# Patient Record
Sex: FEMALE | Race: BLACK OR AFRICAN AMERICAN | NOT HISPANIC OR LATINO | Employment: UNEMPLOYED | URBAN - METROPOLITAN AREA
[De-identification: names, ages, dates, MRNs, and addresses within clinical notes are randomized per-mention and may not be internally consistent; named-entity substitution may affect disease eponyms.]

---

## 2022-09-09 ENCOUNTER — HOSPITAL ENCOUNTER (EMERGENCY)
Facility: HOSPITAL | Age: 37
Discharge: HOME | End: 2022-09-09
Attending: STUDENT IN AN ORGANIZED HEALTH CARE EDUCATION/TRAINING PROGRAM
Payer: MEDICAID

## 2022-09-09 VITALS
HEART RATE: 73 BPM | BODY MASS INDEX: 34.83 KG/M2 | TEMPERATURE: 99 F | WEIGHT: 204 LBS | HEIGHT: 64 IN | OXYGEN SATURATION: 98 % | RESPIRATION RATE: 18 BRPM | SYSTOLIC BLOOD PRESSURE: 136 MMHG | DIASTOLIC BLOOD PRESSURE: 86 MMHG

## 2022-09-09 DIAGNOSIS — Z76.0 MEDICATION REFILL: ICD-10-CM

## 2022-09-09 DIAGNOSIS — R51.9 ACUTE NONINTRACTABLE HEADACHE, UNSPECIFIED HEADACHE TYPE: Primary | ICD-10-CM

## 2022-09-09 LAB
ALBUMIN SERPL-MCNC: 3.6 GM/DL (ref 3.5–5)
ALBUMIN/GLOB SERPL: 1.1 RATIO (ref 1.1–2)
ALP SERPL-CCNC: 76 UNIT/L (ref 40–150)
ALT SERPL-CCNC: 12 UNIT/L (ref 0–55)
AST SERPL-CCNC: 12 UNIT/L (ref 5–34)
B-HCG SERPL QL: NEGATIVE
BASOPHILS # BLD AUTO: 0.02 X10(3)/MCL (ref 0–0.2)
BASOPHILS NFR BLD AUTO: 0.3 %
BILIRUBIN DIRECT+TOT PNL SERPL-MCNC: 0.5 MG/DL
BUN SERPL-MCNC: 9.6 MG/DL (ref 7–18.7)
CALCIUM SERPL-MCNC: 8.8 MG/DL (ref 8.4–10.2)
CHLORIDE SERPL-SCNC: 106 MMOL/L (ref 98–107)
CO2 SERPL-SCNC: 25 MMOL/L (ref 22–29)
CREAT SERPL-MCNC: 0.75 MG/DL (ref 0.55–1.02)
EOSINOPHIL # BLD AUTO: 0.1 X10(3)/MCL (ref 0–0.9)
EOSINOPHIL NFR BLD AUTO: 1.3 %
ERYTHROCYTE [DISTWIDTH] IN BLOOD BY AUTOMATED COUNT: 13.5 % (ref 11.5–17)
FLUAV AG UPPER RESP QL IA.RAPID: NOT DETECTED
FLUBV AG UPPER RESP QL IA.RAPID: NOT DETECTED
GFR SERPLBLD CREATININE-BSD FMLA CKD-EPI: >60 MLS/MIN/1.73/M2
GLOBULIN SER-MCNC: 3.2 GM/DL (ref 2.4–3.5)
GLUCOSE SERPL-MCNC: 85 MG/DL (ref 74–100)
HCT VFR BLD AUTO: 37.7 % (ref 37–47)
HGB BLD-MCNC: 12.6 GM/DL (ref 12–16)
IMM GRANULOCYTES # BLD AUTO: 0.03 X10(3)/MCL (ref 0–0.04)
IMM GRANULOCYTES NFR BLD AUTO: 0.4 %
LYMPHOCYTES # BLD AUTO: 1.75 X10(3)/MCL (ref 0.6–4.6)
LYMPHOCYTES NFR BLD AUTO: 23.1 %
MCH RBC QN AUTO: 32.4 PG (ref 27–31)
MCHC RBC AUTO-ENTMCNC: 33.4 MG/DL (ref 33–36)
MCV RBC AUTO: 96.9 FL (ref 80–94)
MONOCYTES # BLD AUTO: 0.55 X10(3)/MCL (ref 0.1–1.3)
MONOCYTES NFR BLD AUTO: 7.3 %
NEUTROPHILS # BLD AUTO: 5.1 X10(3)/MCL (ref 2.1–9.2)
NEUTROPHILS NFR BLD AUTO: 67.6 %
NRBC BLD AUTO-RTO: 0 %
PLATELET # BLD AUTO: 267 X10(3)/MCL (ref 130–400)
PMV BLD AUTO: 9.9 FL (ref 7.4–10.4)
POTASSIUM SERPL-SCNC: 4 MMOL/L (ref 3.5–5.1)
PROT SERPL-MCNC: 6.8 GM/DL (ref 6.4–8.3)
RBC # BLD AUTO: 3.89 X10(6)/MCL (ref 4.2–5.4)
SARS-COV-2 RNA RESP QL NAA+PROBE: NOT DETECTED
SODIUM SERPL-SCNC: 137 MMOL/L (ref 136–145)
WBC # SPEC AUTO: 7.6 X10(3)/MCL (ref 4.5–11.5)

## 2022-09-09 PROCEDURE — 36415 COLL VENOUS BLD VENIPUNCTURE: CPT | Performed by: NURSE PRACTITIONER

## 2022-09-09 PROCEDURE — 99284 EMERGENCY DEPT VISIT MOD MDM: CPT | Mod: 25

## 2022-09-09 PROCEDURE — 25000003 PHARM REV CODE 250: Performed by: PHYSICIAN ASSISTANT

## 2022-09-09 PROCEDURE — 80053 COMPREHEN METABOLIC PANEL: CPT | Performed by: NURSE PRACTITIONER

## 2022-09-09 PROCEDURE — 87636 SARSCOV2 & INF A&B AMP PRB: CPT | Performed by: NURSE PRACTITIONER

## 2022-09-09 PROCEDURE — 81025 URINE PREGNANCY TEST: CPT | Performed by: NURSE PRACTITIONER

## 2022-09-09 PROCEDURE — 85025 COMPLETE CBC W/AUTO DIFF WBC: CPT | Performed by: NURSE PRACTITIONER

## 2022-09-09 RX ORDER — ACETAMINOPHEN 500 MG
1000 TABLET ORAL
Status: COMPLETED | OUTPATIENT
Start: 2022-09-09 | End: 2022-09-09

## 2022-09-09 RX ORDER — AMLODIPINE BESYLATE 10 MG/1
10 TABLET ORAL DAILY
Qty: 30 TABLET | Refills: 11 | Status: SHIPPED | OUTPATIENT
Start: 2022-09-09 | End: 2023-01-05 | Stop reason: SDUPTHER

## 2022-09-09 RX ORDER — AMLODIPINE BESYLATE 5 MG/1
10 TABLET ORAL
Status: COMPLETED | OUTPATIENT
Start: 2022-09-09 | End: 2022-09-09

## 2022-09-09 RX ADMIN — AMLODIPINE BESYLATE 10 MG: 5 TABLET ORAL at 02:09

## 2022-09-09 RX ADMIN — ACETAMINOPHEN 1000 MG: 500 TABLET, FILM COATED ORAL at 02:09

## 2022-09-09 NOTE — ED PROVIDER NOTES
"Encounter Date: 9/9/2022       History     Chief Complaint   Patient presents with    Headache     Pt complains of headache. Onset through the night. Denies weakness. Hx of HTN. States out of meds for "a while." Denies n/v     37 y.o. female presents to the ED with headache onset this morning. States she has been out of her HTN medications "for awhile" due to moving and being unable to find a new physician. Denies blurred vision, chest pain, SOB, n/v, fever, chills. Did not try any medications at home.     The history is provided by the patient. No  was used.   Headache   This is a new problem. The current episode started today. The problem occurs constantly. The problem has been waxing and waning. The pain is located in the Bilateral region. The pain does not radiate. The quality of the pain is described as aching. Pertinent negatives include no abdominal pain, blurred vision, coughing, dizziness, fever, nausea, neck pain, phonophobia, photophobia or vomiting. Nothing aggravates the symptoms. She has tried nothing for the symptoms.     Review of patient's allergies indicates:  No Known Allergies  No past medical history on file.  No past surgical history on file.  No family history on file.  Social History     Tobacco Use    Smoking status: Every Day     Types: Cigarettes    Smokeless tobacco: Never     Review of Systems   Constitutional:  Negative for chills and fever.   Eyes:  Negative for blurred vision, photophobia and visual disturbance.   Respiratory:  Negative for cough and shortness of breath.    Cardiovascular:  Negative for chest pain.   Gastrointestinal:  Negative for abdominal pain, nausea and vomiting.   Genitourinary:  Negative for dysuria.   Musculoskeletal:  Negative for arthralgias and neck pain.   Skin:  Negative for color change and rash.   Neurological:  Positive for headaches. Negative for dizziness.   Psychiatric/Behavioral:  Negative for behavioral problems.    All other " systems reviewed and are negative.    Physical Exam     Initial Vitals [09/09/22 0909]   BP Pulse Resp Temp SpO2   (!) 142/93 64 18 99 °F (37.2 °C) 99 %      MAP       --         Physical Exam    Nursing note and vitals reviewed.  Constitutional: She appears well-developed and well-nourished.   HENT:   Head: Normocephalic and atraumatic.   Eyes: EOM are normal. Pupils are equal, round, and reactive to light.   Neck: Neck supple.   Cardiovascular:  Normal rate, regular rhythm and normal heart sounds.           Pulmonary/Chest: Breath sounds normal.   Abdominal: Abdomen is soft. Bowel sounds are normal.   Musculoskeletal:         General: Normal range of motion.      Cervical back: Neck supple.     Neurological: She is alert and oriented to person, place, and time. She has normal strength. GCS score is 15. GCS eye subscore is 4. GCS verbal subscore is 5. GCS motor subscore is 6.   Skin: Skin is warm and dry.   Psychiatric: She has a normal mood and affect.       ED Course   Procedures  Labs Reviewed   CBC WITH DIFFERENTIAL - Abnormal; Notable for the following components:       Result Value    RBC 3.89 (*)     MCV 96.9 (*)     MCH 32.4 (*)     All other components within normal limits   COVID/FLU A&B PCR - Normal   HCG QUALITATIVE URINE - Normal   CBC W/ AUTO DIFFERENTIAL    Narrative:     The following orders were created for panel order CBC Auto Differential.  Procedure                               Abnormality         Status                     ---------                               -----------         ------                     CBC with Differential[[id number]]        Abnormal            Final result                 Please view results for these tests on the individual orders.   COMPREHENSIVE METABOLIC PANEL          Imaging Results              CT Head Without Contrast (Final result)  Result time 09/09/22 13:49:58      Final result by Mateus Carroll MD (09/09/22 13:49:58)                   Impression:      No  "acute intracranial abnormality identified.      Electronically signed by: Mateus Carroll  Date:    09/09/2022  Time:    13:49               Narrative:    EXAMINATION:  CT HEAD WITHOUT CONTRAST    CLINICAL HISTORY:  Headache, new or worsening, neuro deficit (Age 19-49y);    TECHNIQUE:  Low dose axial images were obtained through the head.  Coronal and sagittal reformations were also performed. Contrast was not administered.    Automatic exposure control was utilized to reduce the patient's radiation dose.    DLP= 1016    COMPARISON:  None.    FINDINGS:  No acute intracranial hemorrhage, edema or mass. No acute parenchymal abnormality.    There is no hydrocephalus, evidence of herniation or midline shift. The ventricles and sulci are normal.    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    There is normal gray white differentiation.    The osseous structures are normal.    The mastoid air cells are clear.    The auditory canals are patent bilaterally.    The globes and orbital contents are normal bilaterally.    The visualized maxillary, ethmoid and sphenoid sinuses are clear.                                       Medications   acetaminophen tablet 1,000 mg (1,000 mg Oral Given 9/9/22 1412)   amLODIPine tablet 10 mg (10 mg Oral Given 9/9/22 1413)     REPEAT VS: improved BP after amlodipine   /86 (BP Location: Left arm, Patient Position: Sitting)   Pulse 73   Temp 99 °F (37.2 °C) (Oral)   Resp 18   Ht 5' 4" (1.626 m)   Wt 92.5 kg (204 lb)   SpO2 98%   BMI 35.02 kg/m²     Medical Decision Making:   Differential Diagnosis:   ICH, tension headache, migraine headache  Clinical Tests:   Lab Tests: Reviewed  Radiological Study: Reviewed                    Clinical Impression:   Final diagnoses:  [R51.9] Acute nonintractable headache, unspecified headache type (Primary)  [Z76.0] Medication refill      ED Disposition Condition    Discharge Stable          ED Prescriptions       Medication Sig Dispense Start " Date End Date Auth. Provider    amLODIPine (NORVASC) 10 MG tablet Take 1 tablet (10 mg total) by mouth once daily. 30 tablet 9/9/2022 9/9/2023 Ant Mcfarland PA-C          Follow-up Information       Follow up With Specialties Details Why Contact Info    [facility] Emergency Medicine In 1 week If symptoms worsen [address] [facility] [id number] [phone]    Primary care provider  In 2 days As needed     [facility]   Referral has been sent on your behalf; they will call to schedule follow-up appointment              Ant Mcfarland PA-C  09/09/22 2037

## 2022-09-09 NOTE — FIRST PROVIDER EVALUATION
"Medical screening examination initiated.  I have conducted a focused provider triage encounter, findings are as follows:    Brief history of present illness:  Patient states headache, weakness, and runny nose    Vitals:    09/09/22 0909   BP: (!) 142/93   BP Location: Left arm   Patient Position: Sitting   Pulse: 64   Resp: 18   Temp: 99 °F (37.2 °C)   TempSrc: Oral   SpO2: 99%   Weight: 92.5 kg (204 lb)   Height: 5' 4" (1.626 m)       Pertinent physical exam:  Awake, alert, ambulatory    Brief workup plan:  labs    Preliminary workup initiated; this workup will be continued and followed by the physician or advanced practice provider that is assigned to the patient when roomed.  "

## 2022-09-09 NOTE — ED TRIAGE NOTES
"Pt complains of headache. Onset through the night. Denies weakness. Hx of HTN. States out of meds for "a while." Denies n/v  "

## 2023-01-05 ENCOUNTER — OFFICE VISIT (OUTPATIENT)
Dept: FAMILY MEDICINE | Facility: CLINIC | Age: 38
End: 2023-01-05
Payer: MEDICAID

## 2023-01-05 VITALS
TEMPERATURE: 99 F | DIASTOLIC BLOOD PRESSURE: 87 MMHG | BODY MASS INDEX: 34.37 KG/M2 | HEIGHT: 64 IN | OXYGEN SATURATION: 100 % | SYSTOLIC BLOOD PRESSURE: 138 MMHG | WEIGHT: 201.31 LBS | RESPIRATION RATE: 18 BRPM | HEART RATE: 70 BPM

## 2023-01-05 DIAGNOSIS — I10 HYPERTENSION, UNSPECIFIED TYPE: ICD-10-CM

## 2023-01-05 DIAGNOSIS — Z00.00 ENCOUNTER FOR WELLNESS EXAMINATION: Primary | ICD-10-CM

## 2023-01-05 DIAGNOSIS — R10.2 PELVIC PAIN: ICD-10-CM

## 2023-01-05 DIAGNOSIS — F51.01 PRIMARY INSOMNIA: ICD-10-CM

## 2023-01-05 DIAGNOSIS — Z72.0 TOBACCO ABUSE: ICD-10-CM

## 2023-01-05 DIAGNOSIS — F41.9 ANXIETY: ICD-10-CM

## 2023-01-05 LAB
ALBUMIN SERPL-MCNC: 3.8 G/DL (ref 3.5–5)
ALBUMIN/GLOB SERPL: 1.1 RATIO (ref 1.1–2)
ALP SERPL-CCNC: 86 UNIT/L (ref 40–150)
ALT SERPL-CCNC: 10 UNIT/L (ref 0–55)
AMORPH URATE CRY URNS QL MICRO: ABNORMAL /UL
APPEARANCE UR: ABNORMAL
AST SERPL-CCNC: 12 UNIT/L (ref 5–34)
BACTERIA #/AREA URNS AUTO: ABNORMAL /HPF
BASOPHILS # BLD AUTO: 0.03 X10(3)/MCL (ref 0–0.2)
BASOPHILS NFR BLD AUTO: 0.4 %
BILIRUB UR QL STRIP.AUTO: NEGATIVE MG/DL
BILIRUBIN DIRECT+TOT PNL SERPL-MCNC: 0.7 MG/DL
BUN SERPL-MCNC: 10.5 MG/DL (ref 7–18.7)
CALCIUM SERPL-MCNC: 8.8 MG/DL (ref 8.4–10.2)
CHLORIDE SERPL-SCNC: 106 MMOL/L (ref 98–107)
CHOLEST SERPL-MCNC: 113 MG/DL
CHOLEST/HDLC SERPL: 2 {RATIO} (ref 0–5)
CO2 SERPL-SCNC: 26 MMOL/L (ref 22–29)
COLOR UR AUTO: ABNORMAL
CREAT SERPL-MCNC: 0.75 MG/DL (ref 0.55–1.02)
EOSINOPHIL # BLD AUTO: 0.08 X10(3)/MCL (ref 0–0.9)
EOSINOPHIL NFR BLD AUTO: 1.1 %
ERYTHROCYTE [DISTWIDTH] IN BLOOD BY AUTOMATED COUNT: 13.2 % (ref 11–14.5)
EST. AVERAGE GLUCOSE BLD GHB EST-MCNC: 91.1 MG/DL
GFR SERPLBLD CREATININE-BSD FMLA CKD-EPI: >90 MLS/MIN/1.73/M2
GLOBULIN SER-MCNC: 3.4 GM/DL (ref 2.4–3.5)
GLUCOSE SERPL-MCNC: 80 MG/DL (ref 74–100)
GLUCOSE UR QL STRIP.AUTO: NORMAL MG/DL
HAV IGM SERPL QL IA: NONREACTIVE
HBA1C MFR BLD: 4.8 %
HBV CORE IGM SERPL QL IA: NONREACTIVE
HBV SURFACE AG SERPL QL IA: NONREACTIVE
HCT VFR BLD AUTO: 37.9 % (ref 37–47)
HCV AB SERPL QL IA: NONREACTIVE
HDLC SERPL-MCNC: 51 MG/DL (ref 35–60)
HGB BLD-MCNC: 12.8 GM/DL (ref 12–16)
HIV 1+2 AB+HIV1 P24 AG SERPL QL IA: NONREACTIVE
IMM GRANULOCYTES # BLD AUTO: 0.01 X10(3)/MCL (ref 0–0.04)
IMM GRANULOCYTES NFR BLD AUTO: 0.1 %
KETONES UR QL STRIP.AUTO: NEGATIVE MG/DL
LDLC SERPL CALC-MCNC: 54 MG/DL (ref 50–140)
LEUKOCYTE ESTERASE UR QL STRIP.AUTO: NEGATIVE UNIT/L
LYMPHOCYTES # BLD AUTO: 1.77 X10(3)/MCL (ref 0.6–4.6)
LYMPHOCYTES NFR BLD AUTO: 24.5 %
MCH RBC QN AUTO: 32.7 PG
MCHC RBC AUTO-ENTMCNC: 33.8 MG/DL (ref 33–36)
MCV RBC AUTO: 96.7 FL (ref 80–94)
MONOCYTES # BLD AUTO: 0.46 X10(3)/MCL (ref 0.1–1.3)
MONOCYTES NFR BLD AUTO: 6.4 %
NEUTROPHILS # BLD AUTO: 4.86 X10(3)/MCL (ref 2.1–9.2)
NEUTROPHILS NFR BLD AUTO: 67.5 %
NITRITE UR QL STRIP.AUTO: NEGATIVE
NRBC BLD AUTO-RTO: 0 % (ref 0–1)
PH UR STRIP.AUTO: 5.5 [PH]
PLATELET # BLD AUTO: 249 X10(3)/MCL (ref 140–371)
PMV BLD AUTO: 10.5 FL (ref 9.4–12.4)
POTASSIUM SERPL-SCNC: 4.2 MMOL/L (ref 3.5–5.1)
PROT SERPL-MCNC: 7.2 GM/DL (ref 6.4–8.3)
PROT UR QL STRIP.AUTO: ABNORMAL MG/DL
RBC # BLD AUTO: 3.92 X10(6)/MCL (ref 4.2–5.4)
RBC #/AREA URNS AUTO: ABNORMAL /HPF
RBC UR QL AUTO: ABNORMAL UNIT/L
SODIUM SERPL-SCNC: 136 MMOL/L (ref 136–145)
SP GR UR STRIP.AUTO: 1.02
SQUAMOUS #/AREA URNS LPF: ABNORMAL /HPF
T PALLIDUM AB SER QL: NONREACTIVE
T4 FREE SERPL-MCNC: 0.87 NG/DL (ref 0.7–1.48)
TRIGL SERPL-MCNC: 42 MG/DL (ref 37–140)
TSH SERPL-ACNC: 0.66 UIU/ML (ref 0.35–4.94)
UROBILINOGEN UR STRIP-ACNC: NORMAL MG/DL
VLDLC SERPL CALC-MCNC: 8 MG/DL
WBC # SPEC AUTO: 7.2 X10(3)/MCL (ref 4.5–11.5)
WBC #/AREA URNS AUTO: ABNORMAL /HPF

## 2023-01-05 PROCEDURE — 99385 PR PREVENTIVE VISIT,NEW,18-39: ICD-10-PCS | Mod: S$PBB,25,,

## 2023-01-05 PROCEDURE — 80061 LIPID PANEL: CPT

## 2023-01-05 PROCEDURE — 81001 URINALYSIS AUTO W/SCOPE: CPT

## 2023-01-05 PROCEDURE — 80053 COMPREHEN METABOLIC PANEL: CPT

## 2023-01-05 PROCEDURE — 86780 TREPONEMA PALLIDUM: CPT

## 2023-01-05 PROCEDURE — 99406 PR TOBACCO USE CESSATION INTERMEDIATE 3-10 MINUTES: ICD-10-PCS | Mod: S$PBB,,,

## 2023-01-05 PROCEDURE — 80074 ACUTE HEPATITIS PANEL: CPT

## 2023-01-05 PROCEDURE — 3008F PR BODY MASS INDEX (BMI) DOCUMENTED: ICD-10-PCS | Mod: CPTII,,,

## 2023-01-05 PROCEDURE — 83036 HEMOGLOBIN GLYCOSYLATED A1C: CPT

## 2023-01-05 PROCEDURE — 3079F PR MOST RECENT DIASTOLIC BLOOD PRESSURE 80-89 MM HG: ICD-10-PCS | Mod: CPTII,,,

## 2023-01-05 PROCEDURE — 99215 OFFICE O/P EST HI 40 MIN: CPT | Mod: PBBFAC,PN

## 2023-01-05 PROCEDURE — 3079F DIAST BP 80-89 MM HG: CPT | Mod: CPTII,,,

## 2023-01-05 PROCEDURE — 99214 OFFICE O/P EST MOD 30 MIN: CPT | Mod: 25,S$PBB,,

## 2023-01-05 PROCEDURE — 87389 HIV-1 AG W/HIV-1&-2 AB AG IA: CPT

## 2023-01-05 PROCEDURE — 99406 BEHAV CHNG SMOKING 3-10 MIN: CPT | Mod: S$PBB,,,

## 2023-01-05 PROCEDURE — 99214 PR OFFICE/OUTPT VISIT, EST, LEVL IV, 30-39 MIN: ICD-10-PCS | Mod: 25,S$PBB,,

## 2023-01-05 PROCEDURE — 3008F BODY MASS INDEX DOCD: CPT | Mod: CPTII,,,

## 2023-01-05 PROCEDURE — 1159F MED LIST DOCD IN RCRD: CPT | Mod: CPTII,,,

## 2023-01-05 PROCEDURE — 84443 ASSAY THYROID STIM HORMONE: CPT

## 2023-01-05 PROCEDURE — 1160F RVW MEDS BY RX/DR IN RCRD: CPT | Mod: CPTII,,,

## 2023-01-05 PROCEDURE — 36415 COLL VENOUS BLD VENIPUNCTURE: CPT

## 2023-01-05 PROCEDURE — 85025 COMPLETE CBC W/AUTO DIFF WBC: CPT

## 2023-01-05 PROCEDURE — 99385 PREV VISIT NEW AGE 18-39: CPT | Mod: S$PBB,25,,

## 2023-01-05 PROCEDURE — 3075F SYST BP GE 130 - 139MM HG: CPT | Mod: CPTII,,,

## 2023-01-05 PROCEDURE — 3075F PR MOST RECENT SYSTOLIC BLOOD PRESS GE 130-139MM HG: ICD-10-PCS | Mod: CPTII,,,

## 2023-01-05 PROCEDURE — 1159F PR MEDICATION LIST DOCUMENTED IN MEDICAL RECORD: ICD-10-PCS | Mod: CPTII,,,

## 2023-01-05 PROCEDURE — 84439 ASSAY OF FREE THYROXINE: CPT

## 2023-01-05 PROCEDURE — 1160F PR REVIEW ALL MEDS BY PRESCRIBER/CLIN PHARMACIST DOCUMENTED: ICD-10-PCS | Mod: CPTII,,,

## 2023-01-05 RX ORDER — AMLODIPINE BESYLATE 10 MG/1
10 TABLET ORAL DAILY
Qty: 90 TABLET | Refills: 0 | Status: SHIPPED | OUTPATIENT
Start: 2023-01-05 | End: 2023-08-22

## 2023-01-05 RX ORDER — ATOMOXETINE 80 MG/1
80 CAPSULE ORAL EVERY MORNING
COMMUNITY
Start: 2022-12-28

## 2023-01-05 RX ORDER — BUSPIRONE HYDROCHLORIDE 15 MG/1
7.5-15 TABLET ORAL 2 TIMES DAILY
Qty: 60 TABLET | Refills: 2 | Status: SHIPPED | OUTPATIENT
Start: 2023-01-05 | End: 2023-11-01

## 2023-01-05 RX ORDER — CETIRIZINE HYDROCHLORIDE 10 MG/1
10 TABLET ORAL
COMMUNITY
Start: 2022-11-19 | End: 2023-11-01 | Stop reason: SDUPTHER

## 2023-01-05 RX ORDER — QUETIAPINE FUMARATE 100 MG/1
100 TABLET, FILM COATED ORAL NIGHTLY
Qty: 90 TABLET | Refills: 0 | Status: SHIPPED | OUTPATIENT
Start: 2023-01-05 | End: 2023-03-27 | Stop reason: SDUPTHER

## 2023-01-05 RX ORDER — BUSPIRONE HYDROCHLORIDE 15 MG/1
7.5-15 TABLET ORAL 2 TIMES DAILY PRN
COMMUNITY
Start: 2022-12-28 | End: 2023-01-05 | Stop reason: SDUPTHER

## 2023-01-05 RX ORDER — QUETIAPINE FUMARATE 100 MG/1
TABLET, FILM COATED ORAL
COMMUNITY
End: 2023-01-05 | Stop reason: SDUPTHER

## 2023-01-05 NOTE — ASSESSMENT & PLAN NOTE
Encouraged and congratulated on smoking cessation.  Smoking cessation discussed for 5 minutes.  Discussed benefits of quitting including improved health, decreased cardiac/vascular/pulmonary/stroke risks as well as saving money

## 2023-01-05 NOTE — PROGRESS NOTES
Patient Name: Renee Anaya   : 1985  MRN: [id number]     Subjective:   Patient ID: Renee Anaya is a 37 y.o. female.    Chief Complaint:   Chief Complaint   Patient presents with    Establish Care     Gyn referral         HPI: 2023:  Patient presents to clinic today to establish care, she was previously in [place] [facility].  Patient being managed for hypertension, insomnia, ADHD.  Patient states that her medications are effective dose currently.  Patient does not know when her last Pap smear was she does have a son who is 5 years old.  Patient is unsure of when her last wellness labs were drawn but states that she does not think she is ever had any thing wrong with her blood work.    Additionally today's biggest complaint is that she has extremely painful menstrual cycles and will have residual pelvic pain after bowel movements.  Patient states that there is no blood in her stool or urine.  Patient states that it has been this way for about 3 months consistently, previously it was off and on for about 4 years.  Patient further states that she had ovarian cyst between her 1st and 2nd child but states on a recent ultrasound they were unable to see cyst.  Patient states that intercourse is painful as well.  Patient does endorse malodorous urine, states it has been this way for about 2 weeks.  Endorses previous Trichomonas infections.      ROS:  Review of Systems   Constitutional:  Negative for chills, fever and weight loss.   HENT:  Negative for ear discharge, nosebleeds and tinnitus.    Eyes:  Negative for blurred vision, photophobia and pain.   Respiratory:  Negative for cough, shortness of breath, wheezing and stridor.    Cardiovascular:  Negative for chest pain, palpitations and orthopnea.   Gastrointestinal:  Positive for abdominal pain. Negative for heartburn and nausea.   Genitourinary:  Negative for dysuria, frequency, hematuria and urgency.   Musculoskeletal:  Negative for  "falls and myalgias.   Skin:  Negative for itching and rash.   Neurological:  Negative for dizziness, sensory change, speech change, focal weakness, seizures, weakness and headaches.   Endo/Heme/Allergies:  Negative for environmental allergies. Does not bruise/bleed easily.   Psychiatric/Behavioral:  Negative for hallucinations and suicidal ideas.     History:     Past Medical History:   Diagnosis Date    Hypertension       Past Surgical History:   Procedure Laterality Date     SECTION      TONSILLECTOMY       History reviewed. No pertinent family history.   Social History     Tobacco Use    Smoking status: Every Day     Types: Cigarettes    Smokeless tobacco: Never   Substance and Sexual Activity    Alcohol use: Not Currently    Drug use: Yes     Types: Marijuana    Sexual activity: Yes     Partners: Male        Allergies:   Review of patient's allergies indicates:   Allergen Reactions    Penicillins Hives, Itching and Shortness Of Breath    Tramadol Itching    Ibuprofen Nausea Only    Milk containing products Nausea Only     Objective:     Vitals:    23 1225   BP: 138/87   Pulse: 70   Resp: 18   Temp: 98.6 °F (37 °C)   SpO2: 100%   Weight: 91.3 kg (201 lb 4.8 oz)   Height: 5' 4" (1.626 m)     Body mass index is 34.55 kg/m².     Physical Examination:   Physical Exam  Vitals reviewed.   Constitutional:       Appearance: Normal appearance. She is normal weight.   HENT:      Head: Normocephalic.      Right Ear: Tympanic membrane, ear canal and external ear normal.      Left Ear: Tympanic membrane, ear canal and external ear normal.      Nose: Nose normal.      Mouth/Throat:      Mouth: Mucous membranes are moist.      Pharynx: Oropharynx is clear.   Eyes:      Extraocular Movements: Extraocular movements intact.      Conjunctiva/sclera: Conjunctivae normal.      Pupils: Pupils are equal, round, and reactive to light.   Cardiovascular:      Rate and Rhythm: Normal rate and regular rhythm.      Pulses: " Normal pulses.      Heart sounds: Normal heart sounds.   Pulmonary:      Effort: Pulmonary effort is normal.      Breath sounds: Normal breath sounds.   Abdominal:      General: Abdomen is flat. Bowel sounds are normal.      Palpations: Abdomen is soft.   Musculoskeletal:         General: Normal range of motion.      Cervical back: Normal range of motion and neck supple.   Skin:     General: Skin is warm and dry.   Neurological:      General: No focal deficit present.      Mental Status: She is alert and oriented to person, place, and time.   Psychiatric:         Mood and Affect: Mood normal.         Behavior: Behavior normal.       Assessment:     Problem List Items Addressed This Visit          Psychiatric    Anxiety (Chronic)    Overview       Practice deep breathing or abdominal breathing exercises when anxiety occurs.  Exercise daily. Get sunlight daily.  Avoid caffeine, alcohol and stimulants.  Practice positive phrases and repeat throughout the day, yoga, lavender scents or Chamomile tea will help anxiety.  Set healthy boundaries, avoid people and conversations that increase stress.  Reports any symptoms of suicidal or homicidal ideations immediately, if clinic is closed go to nearest emergency room.             Current Assessment & Plan     Chronic issue, controlled with BuSpar 15 mg b.i.d..  Denies SI/HI or episodes of ilir.         Relevant Medications    busPIRone (BUSPAR) 15 MG tablet       Cardiac/Vascular    Hypertension (Chronic)    Overview     Low Sodium Diet (Dash Diet - less than 2 grams of sodium per day).  Monitor Blood Pressure daily and log. Report any consistent numbers greater than 140/90.  Smoking Cessation encouraged to aid in BP reduction.  Maintain healthy weight with goal BMI <30. Exercise 30 minutes per day 5 days per week           Current Assessment & Plan     Chronic issue, stable on Norvasc 10 mg daily. Denies HA, blurred vision, chest pain, SOB, palpitations or edema.             Relevant Medications    amLODIPine (NORVASC) 10 MG tablet       GI    Pelvic pain    Current Assessment & Plan     UA today, ultrasound ordered.         Relevant Orders    US Abdomen Pelvis Doppler Study Limited    US Pelvis Limited Non OB       Other    Primary insomnia (Chronic)    Overview     Avoid caffeine, alcohol and stimulants. Do not use illicit drugs.  Practice positive phrases and repeat throughout the day.  Try yoga, lavender scents or Chamomile tea to promote relaxation.  Set healthy boundaries, avoid people and conversations that increase stress.  Avoid caffeinated beverages after lunch  Avoid alcohol near bedtime (eg, late afternoon and evening)  Avoid smoking or other nicotine intake, particularly during the evening  Exercise regularly for at least 20 minutes, preferably more than four to five hours prior to bedtime  Avoid daytime naps, especially if they are longer than 20 to 30 minutes or occur late in the day  Resolve concerns or worries before bedtime  Try not to force sleep    Sleep Hygiene Techniques: Sleep hygiene refers to actions that tend to improve and maintain good sleep  Power down electronic devices at least one hour prior to bedtime.  Keep room dark; use eye mask or relaxation sound machine to promote rest.  Sleep as long as necessary to feel rested (usually seven to eight hours for adults) and then get out of bed  Maintain a regular sleep schedule, particularly a regular wake-up time in the morning           Current Assessment & Plan     Chronic issue, stable with Seroquel 100 mg nightly.         Relevant Medications    QUEtiapine (SEROQUEL) 100 MG Tab    Tobacco abuse    Current Assessment & Plan     Encouraged and congratulated on smoking cessation.  Smoking cessation discussed for 5 minutes.  Discussed benefits of quitting including improved health, decreased cardiac/vascular/pulmonary/stroke risks as well as saving money            Other Visit Diagnoses       Encounter for  wellness examination    -  Primary    Relevant Orders    TSH    T4, Free    Hemoglobin A1C    SYPHILIS ANTIBODY (WITH REFLEX RPR)    Hepatitis Panel, Acute    Lipid Panel    CBC Auto Differential    Comprehensive Metabolic Panel    HIV 1/2 Ag/Ab (4th Gen)    Urinalysis, Reflex to Urine Culture Urine, Clean Catch    Chlamydia/GC, PCR    Trichomonas Vaginalis, CARINE    Ambulatory referral/consult to Gynecology            Plan:   Renee was seen today for establish care.    Diagnoses and all orders for this visit:    Encounter for wellness examination  -     TSH  -     T4, Free  -     Hemoglobin A1C  -     SYPHILIS ANTIBODY (WITH REFLEX RPR)  -     Hepatitis Panel, Acute  -     Lipid Panel  -     CBC Auto Differential  -     Comprehensive Metabolic Panel  -     HIV 1/2 Ag/Ab (4th Gen)  -     Urinalysis, Reflex to Urine Culture Urine, Clean Catch  -     Chlamydia/GC, PCR  -     Trichomonas Vaginalis, CARINE  -     Ambulatory referral/consult to Gynecology; Future    Hypertension, unspecified type  -     Ambulatory referral/consult to Internal Medicine  -     amLODIPine (NORVASC) 10 MG tablet; Take 1 tablet (10 mg total) by mouth once daily.    Primary insomnia  -     QUEtiapine (SEROQUEL) 100 MG Tab; Take 1 tablet (100 mg total) by mouth nightly.    Anxiety  -     busPIRone (BUSPAR) 15 MG tablet; Take 0.5-1 tablets (7.5-15 mg total) by mouth 2 (two) times daily.    Pelvic pain  -     US Abdomen Pelvis Doppler Study Limited; Future  -     US Pelvis Limited Non OB; Future    Tobacco abuse       Follow up in about 2 weeks (around 1/19/2023) for Virtual Visit, review labs.     This note was created with the assistance of Dragon voice recognition software or phone dictation. There may be transcription errors as a result of using this technology however minimal. Effort has been made to assure accuracy of transcription but any obvious errors or omissions should be clarified with the author of the document

## 2023-01-05 NOTE — ASSESSMENT & PLAN NOTE
Chronic issue, stable on Norvasc 10 mg daily. Denies HA, blurred vision, chest pain, SOB, palpitations or edema.

## 2023-01-08 LAB — PATH REV: NORMAL

## 2023-01-23 ENCOUNTER — HOSPITAL ENCOUNTER (OUTPATIENT)
Dept: RADIOLOGY | Facility: HOSPITAL | Age: 38
Discharge: HOME | End: 2023-01-23
Payer: MEDICAID

## 2023-01-23 DIAGNOSIS — R10.2 PELVIC PAIN: ICD-10-CM

## 2023-01-23 PROCEDURE — 76856 US EXAM PELVIC COMPLETE: CPT | Mod: TC

## 2023-01-24 ENCOUNTER — OFFICE VISIT (OUTPATIENT)
Dept: FAMILY MEDICINE | Facility: CLINIC | Age: 38
End: 2023-01-24
Payer: MEDICAID

## 2023-01-24 DIAGNOSIS — M54.40 BILATERAL LOW BACK PAIN WITH SCIATICA, SCIATICA LATERALITY UNSPECIFIED, UNSPECIFIED CHRONICITY: Primary | ICD-10-CM

## 2023-01-24 DIAGNOSIS — Z72.0 TOBACCO ABUSE: ICD-10-CM

## 2023-01-24 DIAGNOSIS — R93.41 ABNORMAL ULTRASOUND OF BLADDER: ICD-10-CM

## 2023-01-24 DIAGNOSIS — F51.01 PRIMARY INSOMNIA: Chronic | ICD-10-CM

## 2023-01-24 PROBLEM — E66.811 CLASS 1 OBESITY: Status: ACTIVE | Noted: 2023-01-24

## 2023-01-24 PROBLEM — E66.9 CLASS 1 OBESITY: Status: ACTIVE | Noted: 2023-01-24

## 2023-01-24 PROCEDURE — 99406 PR TOBACCO USE CESSATION INTERMEDIATE 3-10 MINUTES: ICD-10-PCS | Mod: 95,,,

## 2023-01-24 PROCEDURE — 1159F MED LIST DOCD IN RCRD: CPT | Mod: CPTII,95,,

## 2023-01-24 PROCEDURE — 99406 BEHAV CHNG SMOKING 3-10 MIN: CPT | Mod: 95,,,

## 2023-01-24 PROCEDURE — 1160F PR REVIEW ALL MEDS BY PRESCRIBER/CLIN PHARMACIST DOCUMENTED: ICD-10-PCS | Mod: CPTII,95,,

## 2023-01-24 PROCEDURE — 1160F RVW MEDS BY RX/DR IN RCRD: CPT | Mod: CPTII,95,,

## 2023-01-24 PROCEDURE — 99213 PR OFFICE/OUTPT VISIT, EST, LEVL III, 20-29 MIN: ICD-10-PCS | Mod: 95,25,,

## 2023-01-24 PROCEDURE — 1159F PR MEDICATION LIST DOCUMENTED IN MEDICAL RECORD: ICD-10-PCS | Mod: CPTII,95,,

## 2023-01-24 PROCEDURE — 99213 OFFICE O/P EST LOW 20 MIN: CPT | Mod: 95,25,,

## 2023-01-24 RX ORDER — IBUPROFEN 600 MG/1
TABLET ORAL
COMMUNITY
Start: 2023-01-16

## 2023-01-24 RX ORDER — CYCLOBENZAPRINE HCL 10 MG
TABLET ORAL
COMMUNITY
Start: 2023-01-16

## 2023-01-24 NOTE — ASSESSMENT & PLAN NOTE
Encouraged  smoking cessation.  Smoking cessation discussed for 5 minutes.  Discussed benefits of quitting including improved health, decreased cardiac/vascular/pulmonary/stroke risks as well as saving money

## 2023-01-24 NOTE — PROGRESS NOTES
"Audio Only Telehealth Visit     The patient location is:  [place]  The chief complaint leading to consultation is:  Review labs, review ultrasound, low back pain  Visit type: Virtual visit with audio only (telephone)  Total time spent with patient:  25 minutes     The reason for the audio only service rather than synchronous audio and video virtual visit was related to technical difficulties or patient preference/necessity.     Each patient to whom I provide medical services by telemedicine is:  (1) informed of the relationship between the physician and patient and the respective role of any other health care provider with respect to management of the patient; and (2) notified that they may decline to receive medical services by telemedicine and may withdraw from such care at any time. Patient verbally consented to receive this service via voice-only telephone call.    Patient Name: Renee Anaya   : 1985  MRN: [id number]     Subjective:   Patient ID: Renee Anaya is a 37 y.o. female.    Chief Complaint:   Chief Complaint   Patient presents with    Follow-up     Telemed, labs         HPI: 2023: Patient reported to emergency department since last office visit for back pain, patient states that she "fell down due to weakness" that was caused by her back pain.  States that the emergency department on gave her muscle relaxers.  She is requesting further workup of his back pain.  She has not tried any formal physical therapy.  Patient denies any changes to bladder or bowel continence, saddle anesthesia.  Patient denies any known trauma or fall leading up to this instance.  Patient states she is still able to move around and has not been utilizing Flexeril.  Additionally since last office visit patient did complete pelvic ultrasound, this study showed solid echogenic structure in the posterior wall of the bladder that measures 1.7 x 1.8 x 2 cm exact nature of these abnormality cannot be ascertained by the " US. Follow up imaging ordered and discussed with patient today.     01/05/2023:  Patient presents to clinic today to establish care, she was previously in [facility] primary care group.  Patient being managed for hypertension, insomnia, ADHD.  Patient states that her medications are effective dose currently.  Patient does not know when her last Pap smear was she does have a son who is 5 years old.  Patient is unsure of when her last wellness labs were drawn but states that she does not think she is ever had any thing wrong with her blood work.    Additionally today's biggest complaint is that she has extremely painful menstrual cycles and will have residual pelvic pain after bowel movements.  Patient states that there is no blood in her stool or urine.  Patient states that it has been this way for about 3 months consistently, previously it was off and on for about 4 years.  Patient further states that she had ovarian cyst between her 1st and 2nd child but states on a recent ultrasound they were unable to see cyst.  Patient states that intercourse is painful as well.  Patient does endorse malodorous urine, states it has been this way for about 2 weeks.  Endorses previous Trichomonas infections.      ROS:  Review of Systems   Constitutional:  Negative for chills, fever and weight loss.   HENT:  Negative for ear discharge, nosebleeds and tinnitus.    Eyes:  Negative for blurred vision, photophobia and pain.   Respiratory:  Negative for cough, shortness of breath, wheezing and stridor.    Cardiovascular:  Negative for chest pain, palpitations and orthopnea.   Gastrointestinal:  Positive for abdominal pain. Negative for heartburn and nausea.   Genitourinary:  Negative for dysuria, frequency, hematuria and urgency.   Musculoskeletal:  Positive for back pain. Negative for falls and myalgias.   Skin:  Negative for itching and rash.   Neurological:  Negative for dizziness, sensory change, speech change, focal  weakness, seizures, weakness and headaches.   Endo/Heme/Allergies:  Negative for environmental allergies. Does not bruise/bleed easily.   Psychiatric/Behavioral:  Negative for hallucinations and suicidal ideas.     History:     Past Medical History:   Diagnosis Date    Hypertension       Past Surgical History:   Procedure Laterality Date     SECTION      TONSILLECTOMY       History reviewed. No pertinent family history.   Social History     Tobacco Use    Smoking status: Every Day     Types: Cigarettes    Smokeless tobacco: Never   Substance and Sexual Activity    Alcohol use: Not Currently    Drug use: Yes     Types: Marijuana    Sexual activity: Yes     Partners: Male        Allergies:   Review of patient's allergies indicates:   Allergen Reactions    Penicillins Hives, Itching and Shortness Of Breath    Tramadol Itching    Ibuprofen Nausea Only    Milk containing products Nausea Only     Objective:   There were no vitals filed for this visit.  There is no height or weight on file to calculate BMI.     Physical Examination:   Physical Exam  Constitutional:       General: She is not in acute distress.     Comments: Limited Physical Exam due to telemedicine visit   Pulmonary:      Effort: Pulmonary effort is normal. No respiratory distress.   Neurological:      Mental Status: She is alert.   Psychiatric:         Mood and Affect: Mood normal.         Behavior: Behavior normal.       Assessment:     Problem List Items Addressed This Visit          Orthopedic    Bilateral low back pain with sciatica - Primary (Chronic)    Current Assessment & Plan     Lower back stretches daily.  Avoid strenuous lifting, use proper body mechanics.  Heating pad, Ice pack, Biofreeze or Epsom salt baths as needed.  Exercise to strengthen core muscles to support your back.  PT Referral given.             Relevant Orders    Ambulatory referral/consult to Physical/Occupational Therapy       Other    Primary insomnia (Chronic)     Overview     Avoid caffeine, alcohol and stimulants. Do not use illicit drugs.  Practice positive phrases and repeat throughout the day.  Try yoga, lavender scents or Chamomile tea to promote relaxation.  Set healthy boundaries, avoid people and conversations that increase stress.  Avoid caffeinated beverages after lunch  Avoid alcohol near bedtime (eg, late afternoon and evening)  Avoid smoking or other nicotine intake, particularly during the evening  Exercise regularly for at least 20 minutes, preferably more than four to five hours prior to bedtime  Avoid daytime naps, especially if they are longer than 20 to 30 minutes or occur late in the day  Resolve concerns or worries before bedtime  Try not to force sleep    Sleep Hygiene Techniques: Sleep hygiene refers to actions that tend to improve and maintain good sleep  Power down electronic devices at least one hour prior to bedtime.  Keep room dark; use eye mask or relaxation sound machine to promote rest.  Sleep as long as necessary to feel rested (usually seven to eight hours for adults) and then get out of bed  Maintain a regular sleep schedule, particularly a regular wake-up time in the morning           Current Assessment & Plan     Chronic issue, stable with Seroquel 100 mg nightly         Tobacco abuse    Current Assessment & Plan     Encouraged  smoking cessation.  Smoking cessation discussed for 5 minutes.  Discussed benefits of quitting including improved health, decreased cardiac/vascular/pulmonary/stroke risks as well as saving money            Other Visit Diagnoses       Abnormal ultrasound of bladder        CT abdomen and pelvis order to further assess abnormal ultrasound.    Relevant Orders    CT Abdomen Pelvis W Wo Contrast            Plan:   Renee was seen today for follow-up.    Diagnoses and all orders for this visit:    Bilateral low back pain with sciatica, sciatica laterality unspecified, unspecified chronicity  -     Ambulatory  referral/consult to Physical/Occupational Therapy; Future    Abnormal ultrasound of bladder  Comments:  CT abdomen and pelvis order to further assess abnormal ultrasound.  Orders:  -     CT Abdomen Pelvis W Wo Contrast; Future    Tobacco abuse    Primary insomnia       Follow up in about 2 months (around 3/24/2023) for review CT, assess PT/back pain.       This note was created with the assistance of a voice recognition software or phone dictation. There may be transcription errors as a result of using this technology however minimal. Effort has been made to assure accuracy of transcription but any obvious errors or omissions should be clarified with the author of the document      This service was not originating from a related E/M service provided within the previous 7 days nor will  to an E/M service or procedure within the next 24 hours or my soonest available appointment.  Prevailing standard of care was able to be met in this audio-only visit.     I spent a total of 25 minutes on the day of the visit.This includes face to face time and non-face to face time preparing to see the patient (eg, review of tests), obtaining and/or reviewing separately obtained history, documenting clinical information in the electronic or other health record, independently interpreting results and communicating results to the patient/family/caregiver, or care coordinator.

## 2023-03-27 ENCOUNTER — OFFICE VISIT (OUTPATIENT)
Dept: FAMILY MEDICINE | Facility: CLINIC | Age: 38
End: 2023-03-27
Payer: MEDICAID

## 2023-03-27 VITALS
WEIGHT: 206 LBS | RESPIRATION RATE: 18 BRPM | DIASTOLIC BLOOD PRESSURE: 82 MMHG | SYSTOLIC BLOOD PRESSURE: 124 MMHG | BODY MASS INDEX: 35.17 KG/M2 | HEIGHT: 64 IN | OXYGEN SATURATION: 98 % | HEART RATE: 80 BPM | TEMPERATURE: 98 F

## 2023-03-27 DIAGNOSIS — R06.02 SHORTNESS OF BREATH: ICD-10-CM

## 2023-03-27 DIAGNOSIS — F51.01 PRIMARY INSOMNIA: Chronic | ICD-10-CM

## 2023-03-27 DIAGNOSIS — Z72.0 TOBACCO ABUSE: ICD-10-CM

## 2023-03-27 DIAGNOSIS — R07.89 CHEST WALL DISCOMFORT: ICD-10-CM

## 2023-03-27 DIAGNOSIS — R93.41 ABNORMAL ULTRASOUND OF BLADDER: Primary | ICD-10-CM

## 2023-03-27 DIAGNOSIS — R05.9 COUGH, UNSPECIFIED TYPE: ICD-10-CM

## 2023-03-27 PROCEDURE — 3074F SYST BP LT 130 MM HG: CPT | Mod: CPTII,,,

## 2023-03-27 PROCEDURE — 1159F MED LIST DOCD IN RCRD: CPT | Mod: CPTII,,,

## 2023-03-27 PROCEDURE — 3079F PR MOST RECENT DIASTOLIC BLOOD PRESSURE 80-89 MM HG: ICD-10-PCS | Mod: CPTII,,,

## 2023-03-27 PROCEDURE — 3079F DIAST BP 80-89 MM HG: CPT | Mod: CPTII,,,

## 2023-03-27 PROCEDURE — 1160F PR REVIEW ALL MEDS BY PRESCRIBER/CLIN PHARMACIST DOCUMENTED: ICD-10-PCS | Mod: CPTII,,,

## 2023-03-27 PROCEDURE — 3008F PR BODY MASS INDEX (BMI) DOCUMENTED: ICD-10-PCS | Mod: CPTII,,,

## 2023-03-27 PROCEDURE — 1160F RVW MEDS BY RX/DR IN RCRD: CPT | Mod: CPTII,,,

## 2023-03-27 PROCEDURE — 99214 PR OFFICE/OUTPT VISIT, EST, LEVL IV, 30-39 MIN: ICD-10-PCS | Mod: 25,S$PBB,,

## 2023-03-27 PROCEDURE — 3008F BODY MASS INDEX DOCD: CPT | Mod: CPTII,,,

## 2023-03-27 PROCEDURE — 3074F PR MOST RECENT SYSTOLIC BLOOD PRESSURE < 130 MM HG: ICD-10-PCS | Mod: CPTII,,,

## 2023-03-27 PROCEDURE — 99214 OFFICE O/P EST MOD 30 MIN: CPT | Mod: 25,S$PBB,,

## 2023-03-27 PROCEDURE — 1159F PR MEDICATION LIST DOCUMENTED IN MEDICAL RECORD: ICD-10-PCS | Mod: CPTII,,,

## 2023-03-27 PROCEDURE — 99214 OFFICE O/P EST MOD 30 MIN: CPT | Mod: PBBFAC,PN

## 2023-03-27 PROCEDURE — 99406 PR TOBACCO USE CESSATION INTERMEDIATE 3-10 MINUTES: ICD-10-PCS | Mod: S$PBB,,,

## 2023-03-27 PROCEDURE — 99406 BEHAV CHNG SMOKING 3-10 MIN: CPT | Mod: S$PBB,,,

## 2023-03-27 RX ORDER — QUETIAPINE FUMARATE 100 MG/1
100 TABLET, FILM COATED ORAL NIGHTLY
Qty: 90 TABLET | Refills: 0 | Status: SHIPPED | OUTPATIENT
Start: 2023-03-27 | End: 2023-08-22

## 2023-03-27 RX ORDER — PROMETHAZINE HYDROCHLORIDE AND DEXTROMETHORPHAN HYDROBROMIDE 6.25; 15 MG/5ML; MG/5ML
5 SYRUP ORAL EVERY 6 HOURS PRN
Qty: 180 ML | Refills: 0 | Status: SHIPPED | OUTPATIENT
Start: 2023-03-27 | End: 2023-04-06

## 2023-03-27 RX ORDER — ALBUTEROL SULFATE 90 UG/1
2 AEROSOL, METERED RESPIRATORY (INHALATION) EVERY 6 HOURS PRN
Qty: 18 G | Refills: 1 | Status: SHIPPED | OUTPATIENT
Start: 2023-03-27

## 2023-03-27 NOTE — PROGRESS NOTES
"Patient Name: Renee Anaya   : 1985  MRN: [id number]     Subjective:   Patient ID: Renee Anaya is a 37 y.o. female.    Chief Complaint:   Chief Complaint   Patient presents with    Follow-up     C/O of pain under breast         HPI: 2023:  Appointment today was to review CT abdomen and pelvis that was ordered to further assess echogenic structure in the posterior wall of the bladder, patient never completed this imaging.  Ensured that patient has 2. Scheduling department in order to have this imaging scheduled.  Discussed with patient's importance of having scheduled.  Additionally patient states that she has no back pain and has never completed physical therapy today.  Today she is complaining of an anterior wall chest pain, states that she went to [facility] who told her that she will need a cardiologist.  Denies HA, blurred vision, chest pain, SOB, palpitations or edema in clinic today. She endorses reducing tobacco use to help reduce cough, states she is coughing pretty frequently and it is increased at night.  Denies any fever, wheezing, sputum production, exposure to known illnesses.    2023: Patient reported to emergency department since last office visit for back pain, patient states that she "fell down due to weakness" that was caused by her back pain.  States that the emergency department on gave her muscle relaxers.  She is requesting further workup of his back pain.  She has not tried any formal physical therapy.  Patient denies any changes to bladder or bowel continence, saddle anesthesia.  Patient denies any known trauma or fall leading up to this instance.  Patient states she is still able to move around and has not been utilizing Flexeril.  Additionally since last office visit patient did complete pelvic ultrasound, this study showed solid echogenic structure in the posterior wall of the bladder that measures 1.7 x 1.8 x 2 cm exact nature of these abnormality cannot be " ascertained by the US. Follow up imaging ordered and discussed with patient today.     01/05/2023:  Patient presents to clinic today to establish care, she was previously in [facility] primary care group.  Patient being managed for hypertension, insomnia, ADHD.  Patient states that her medications are effective dose currently.  Patient does not know when her last Pap smear was she does have a son who is 5 years old.  Patient is unsure of when her last wellness labs were drawn but states that she does not think she is ever had any thing wrong with her blood work.    Additionally today's biggest complaint is that she has extremely painful menstrual cycles and will have residual pelvic pain after bowel movements.  Patient states that there is no blood in her stool or urine.  Patient states that it has been this way for about 3 months consistently, previously it was off and on for about 4 years.  Patient further states that she had ovarian cyst between her 1st and 2nd child but states on a recent ultrasound they were unable to see cyst.  Patient states that intercourse is painful as well.  Patient does endorse malodorous urine, states it has been this way for about 2 weeks.  Endorses previous Trichomonas infections.      ROS:  Review of Systems   Constitutional:  Negative for chills, fever and weight loss.   HENT:  Negative for congestion, ear discharge, nosebleeds and tinnitus.    Eyes:  Negative for blurred vision, photophobia and pain.   Respiratory:  Positive for cough. Negative for hemoptysis, sputum production, shortness of breath, wheezing and stridor.    Cardiovascular:  Negative for chest pain, palpitations and orthopnea.   Gastrointestinal:  Negative for abdominal pain, heartburn and nausea.   Genitourinary:  Negative for dysuria, frequency, hematuria and urgency.   Musculoskeletal:  Positive for back pain, joint pain and myalgias. Negative for falls.   Skin:  Negative for itching and rash.  "  Neurological:  Negative for dizziness, sensory change, speech change, focal weakness, seizures, weakness and headaches.   Endo/Heme/Allergies:  Negative for environmental allergies. Does not bruise/bleed easily.   Psychiatric/Behavioral:  Negative for hallucinations and suicidal ideas.     History:     Past Medical History:   Diagnosis Date    Hypertension       Past Surgical History:   Procedure Laterality Date     SECTION      TONSILLECTOMY       History reviewed. No pertinent family history.   Social History     Tobacco Use    Smoking status: Every Day     Types: Cigarettes    Smokeless tobacco: Never   Substance and Sexual Activity    Alcohol use: Not Currently    Drug use: Yes     Types: Marijuana    Sexual activity: Yes     Partners: Male        Allergies:   Review of patient's allergies indicates:   Allergen Reactions    Penicillins Hives, Itching and Shortness Of Breath    Tramadol Itching    Ibuprofen Nausea Only    Milk containing products Nausea Only     Objective:     Vitals:    23 0923   BP: 124/82   Pulse: 80   Resp: 18   Temp: 98 °F (36.7 °C)   SpO2: 98%   Weight: 93.4 kg (206 lb)   Height: 5' 4" (1.626 m)     Body mass index is 35.36 kg/m².     Physical Examination:   Physical Exam  Constitutional:       General: She is not in acute distress.     Appearance: Normal appearance. She is not ill-appearing.   Cardiovascular:      Rate and Rhythm: Normal rate and regular rhythm.      Pulses: Normal pulses.      Heart sounds: Normal heart sounds.   Pulmonary:      Effort: Pulmonary effort is normal. No respiratory distress.      Breath sounds: Normal breath sounds. No wheezing.   Chest:      Chest wall: No deformity, swelling or tenderness.   Breasts:     Right: Normal. No mass, nipple discharge, skin change or tenderness.      Left: Normal. No mass, nipple discharge, skin change or tenderness.   Musculoskeletal:      Cervical back: Normal range of motion.   Skin:     General: Skin is warm " and dry.   Neurological:      Mental Status: She is alert and oriented to person, place, and time.   Psychiatric:         Mood and Affect: Mood normal.         Behavior: Behavior normal.       Assessment:     Problem List Items Addressed This Visit          Other    Primary insomnia (Chronic)    Overview     Avoid caffeine, alcohol and stimulants. Do not use illicit drugs.  Practice positive phrases and repeat throughout the day.  Try yoga, lavender scents or Chamomile tea to promote relaxation.  Set healthy boundaries, avoid people and conversations that increase stress.  Avoid caffeinated beverages after lunch  Avoid alcohol near bedtime (eg, late afternoon and evening)  Avoid smoking or other nicotine intake, particularly during the evening  Exercise regularly for at least 20 minutes, preferably more than four to five hours prior to bedtime  Avoid daytime naps, especially if they are longer than 20 to 30 minutes or occur late in the day  Resolve concerns or worries before bedtime  Try not to force sleep    Sleep Hygiene Techniques: Sleep hygiene refers to actions that tend to improve and maintain good sleep  Power down electronic devices at least one hour prior to bedtime.  Keep room dark; use eye mask or relaxation sound machine to promote rest.  Sleep as long as necessary to feel rested (usually seven to eight hours for adults) and then get out of bed  Maintain a regular sleep schedule, particularly a regular wake-up time in the morning           Relevant Medications    QUEtiapine (SEROQUEL) 100 MG Tab    Tobacco abuse    Current Assessment & Plan     Encouraged smoking cessation.  Smoking cessation discussed for 5 minutes.  Discussed benefits of quitting including improved health, decreased cardiac/vascular/pulmonary/stroke risks as well as saving money  Encouraged continued reductions to help manage cough.          Other Visit Diagnoses       Abnormal ultrasound of bladder    -  Primary    Relevant Orders     Ambulatory referral/consult to Gynecology    Chest wall discomfort        Relevant Orders    Holter monitor - 48 hour    Echo    Ambulatory referral/consult to Cardiology    Cough, unspecified type        Relevant Medications    promethazine-dextromethorphan (PROMETHAZINE-DM) 6.25-15 mg/5 mL Syrp    Shortness of breath        Relevant Medications    albuterol (PROVENTIL HFA) 90 mcg/actuation inhaler            Plan:   Renee was seen today for follow-up.    Diagnoses and all orders for this visit:    Abnormal ultrasound of bladder  -     Ambulatory referral/consult to Gynecology; Future    Chest wall discomfort  -     Holter monitor - 48 hour; Future  -     Echo; Future  -     Ambulatory referral/consult to Cardiology; Future    Primary insomnia  -     QUEtiapine (SEROQUEL) 100 MG Tab; Take 1 tablet (100 mg total) by mouth nightly.    Cough, unspecified type  -     promethazine-dextromethorphan (PROMETHAZINE-DM) 6.25-15 mg/5 mL Syrp; Take 5 mLs by mouth every 6 (six) hours as needed (cough).    Shortness of breath  -     albuterol (PROVENTIL HFA) 90 mcg/actuation inhaler; Inhale 2 puffs into the lungs every 6 (six) hours as needed for Wheezing. Rescue    Tobacco abuse       Follow up in about 6 months (around 9/27/2023), or if symptoms worsen or fail to improve, for routine labs recheck.     This note was created with the assistance of Dragon voice recognition software or phone dictation. There may be transcription errors as a result of using this technology however minimal. Effort has been made to assure accuracy of transcription but any obvious errors or omissions should be clarified with the author of the document

## 2023-03-27 NOTE — ASSESSMENT & PLAN NOTE
Encouraged smoking cessation.  Smoking cessation discussed for 5 minutes.  Discussed benefits of quitting including improved health, decreased cardiac/vascular/pulmonary/stroke risks as well as saving money  Encouraged continued reductions to help manage cough.

## 2023-03-30 ENCOUNTER — PROCEDURE VISIT (OUTPATIENT)
Dept: FAMILY MEDICINE | Facility: CLINIC | Age: 38
End: 2023-03-30
Payer: MEDICAID

## 2023-03-30 VITALS
TEMPERATURE: 97 F | DIASTOLIC BLOOD PRESSURE: 73 MMHG | SYSTOLIC BLOOD PRESSURE: 134 MMHG | OXYGEN SATURATION: 98 % | BODY MASS INDEX: 35.74 KG/M2 | WEIGHT: 209.38 LBS | HEART RATE: 70 BPM | HEIGHT: 64 IN | RESPIRATION RATE: 18 BRPM

## 2023-03-30 DIAGNOSIS — R93.41 ABNORMAL ULTRASOUND OF BLADDER: Primary | ICD-10-CM

## 2023-03-30 DIAGNOSIS — Z12.4 ENCOUNTER FOR PAPANICOLAOU SMEAR OF CERVIX: Primary | ICD-10-CM

## 2023-03-30 PROCEDURE — 88174 CYTOPATH C/V AUTO IN FLUID: CPT

## 2023-03-30 PROCEDURE — 87529 HSV DNA AMP PROBE: CPT

## 2023-03-30 PROCEDURE — 87661 TRICHOMONAS VAGINALIS AMPLIF: CPT

## 2023-03-30 PROCEDURE — 99213 OFFICE O/P EST LOW 20 MIN: CPT | Mod: S$PBB,25,,

## 2023-03-30 PROCEDURE — 99213 PR OFFICE/OUTPT VISIT, EST, LEVL III, 20-29 MIN: ICD-10-PCS | Mod: S$PBB,25,,

## 2023-03-30 PROCEDURE — 87591 N.GONORRHOEAE DNA AMP PROB: CPT

## 2023-03-30 PROCEDURE — 87511 GARDNER VAG DNA AMP PROBE: CPT

## 2023-03-30 PROCEDURE — 87481 CANDIDA DNA AMP PROBE: CPT

## 2023-03-30 PROCEDURE — 87491 CHLMYD TRACH DNA AMP PROBE: CPT

## 2023-03-30 NOTE — PROGRESS NOTES
"  Subjective:       Renee Anaya is a 37 y.o. woman who comes in today for a  pap smear only. Her most recent annual exam was on 1/2023. Her most recent Pap smear was 5 years ago and showed no abnormalities. Previous abnormal Pap smears: no. Contraception: none    The following portions of the patient's history were reviewed and updated as appropriate: allergies, current medications, past family history, past medical history, past social history, past surgical history, and problem list.    Review of Systems  A comprehensive review of systems was negative.     Objective:      /73   Pulse 70   Temp 96.7 °F (35.9 °C)   Resp 18   Ht 5' 4" (1.626 m)   Wt 95 kg (209 lb 6.4 oz)   LMP 03/21/2023   SpO2 98%   BMI 35.94 kg/m²   Pelvic Exam: cervix normal in appearance, external genitalia normal, and vagina normal without discharge. Pap smear obtained.     Assessment:      Screening pap smear.     Plan:      Follow up in 1 year, or as indicated by Pap results.   "

## 2023-04-04 ENCOUNTER — HOSPITAL ENCOUNTER (OUTPATIENT)
Dept: CARDIOLOGY | Facility: HOSPITAL | Age: 38
Discharge: HOME | End: 2023-04-04
Payer: MEDICAID

## 2023-04-04 DIAGNOSIS — R07.89 CHEST WALL DISCOMFORT: ICD-10-CM

## 2023-04-04 LAB
AV INDEX (PROSTH): 0.82
AV MEAN GRADIENT: 6 MMHG
AV PEAK GRADIENT: 11 MMHG
AV VALVE AREA: 2.57 CM2
AV VELOCITY RATIO: 0.79
CV ECHO LV RWT: 0.21 CM
DOP CALC AO PEAK VEL: 1.64 M/S
DOP CALC AO VTI: 32.8 CM
DOP CALC LVOT AREA: 3.1 CM2
DOP CALC LVOT DIAMETER: 2 CM
DOP CALC LVOT PEAK VEL: 1.29 M/S
DOP CALC LVOT STROKE VOLUME: 84.15 CM3
DOP CALC MV VTI: 30.9 CM
DOP CALCLVOT PEAK VEL VTI: 26.8 CM
E WAVE DECELERATION TIME: 151 MSEC
E/A RATIO: 1.39
E/E' RATIO: 7.69 M/S
ECHO LV POSTERIOR WALL: 0.58 CM (ref 0.6–1.1)
EJECTION FRACTION: 60 %
FRACTIONAL SHORTENING: 52 % (ref 28–44)
INTERVENTRICULAR SEPTUM: 1.07 CM (ref 0.6–1.1)
LEFT ATRIUM SIZE: 2.9 CM
LEFT ATRIUM VOLUME MOD: 41.1 CM3
LEFT INTERNAL DIMENSION IN SYSTOLE: 2.7 CM (ref 2.1–4)
LEFT VENTRICLE DIASTOLIC VOLUME: 153 ML
LEFT VENTRICLE SYSTOLIC VOLUME: 27 ML
LEFT VENTRICULAR INTERNAL DIMENSION IN DIASTOLE: 5.59 CM (ref 3.5–6)
LEFT VENTRICULAR MASS: 171.02 G
LV LATERAL E/E' RATIO: 6.25 M/S
LV SEPTAL E/E' RATIO: 10 M/S
LVOT MG: 4 MMHG
LVOT MV: 0.85 CM/S
MV MEAN GRADIENT: 3 MMHG
MV PEAK A VEL: 0.72 M/S
MV PEAK E VEL: 1 M/S
MV PEAK GRADIENT: 5 MMHG
MV STENOSIS PRESSURE HALF TIME: 65 MS
MV VALVE AREA BY CONTINUITY EQUATION: 2.72 CM2
MV VALVE AREA P 1/2 METHOD: 3.38 CM2
PISA TR MAX VEL: 2.16 M/S
PV PEAK VELOCITY: 1.1 CM/S
RA PRESSURE: 3 MMHG
TDI LATERAL: 0.16 M/S
TDI SEPTAL: 0.1 M/S
TDI: 0.13 M/S
TR MAX PG: 19 MMHG
TRICUSPID ANNULAR PLANE SYSTOLIC EXCURSION: 3.19 CM
TV REST PULMONARY ARTERY PRESSURE: 22 MMHG

## 2023-04-04 PROCEDURE — 93306 TTE W/DOPPLER COMPLETE: CPT

## 2023-04-04 PROCEDURE — 93306 TTE W/DOPPLER COMPLETE: CPT | Mod: 26,,, | Performed by: INTERNAL MEDICINE

## 2023-04-04 PROCEDURE — 93306 ECHO (CUPID ONLY): ICD-10-PCS | Mod: 26,,, | Performed by: INTERNAL MEDICINE

## 2023-04-10 LAB — PSYCHE PATHOLOGY RESULT: NORMAL

## 2023-04-17 DIAGNOSIS — R93.41 ABNORMAL ULTRASOUND OF BLADDER: Primary | ICD-10-CM

## 2023-04-25 DIAGNOSIS — R93.41 ABNORMAL ULTRASOUND OF BLADDER: Primary | ICD-10-CM

## 2023-05-05 ENCOUNTER — TELEPHONE (OUTPATIENT)
Dept: FAMILY MEDICINE | Facility: CLINIC | Age: 38
End: 2023-05-05

## 2023-06-02 ENCOUNTER — HOSPITAL ENCOUNTER (OUTPATIENT)
Dept: RADIOLOGY | Facility: HOSPITAL | Age: 38
Discharge: HOME | End: 2023-06-02
Payer: MEDICAID

## 2023-06-02 DIAGNOSIS — R93.41 ABNORMAL ULTRASOUND OF BLADDER: ICD-10-CM

## 2023-06-02 PROCEDURE — 25500020 PHARM REV CODE 255

## 2023-06-02 PROCEDURE — A9577 INJ MULTIHANCE: HCPCS

## 2023-06-02 PROCEDURE — 72197 MRI PELVIS W/O & W/DYE: CPT | Mod: TC

## 2023-06-02 RX ADMIN — GADOBENATE DIMEGLUMINE 20 ML: 529 INJECTION, SOLUTION INTRAVENOUS at 09:06

## 2023-06-06 ENCOUNTER — PATIENT MESSAGE (OUTPATIENT)
Dept: FAMILY MEDICINE | Facility: CLINIC | Age: 38
End: 2023-06-06
Payer: MEDICAID

## 2023-06-06 DIAGNOSIS — D21.9 LEIOMYOMA: Primary | ICD-10-CM

## 2023-08-22 ENCOUNTER — OFFICE VISIT (OUTPATIENT)
Dept: FAMILY MEDICINE | Facility: CLINIC | Age: 38
End: 2023-08-22
Payer: MEDICAID

## 2023-08-22 VITALS
HEART RATE: 60 BPM | HEIGHT: 64 IN | DIASTOLIC BLOOD PRESSURE: 87 MMHG | TEMPERATURE: 98 F | WEIGHT: 199.81 LBS | SYSTOLIC BLOOD PRESSURE: 139 MMHG | RESPIRATION RATE: 20 BRPM | BODY MASS INDEX: 34.11 KG/M2 | OXYGEN SATURATION: 99 %

## 2023-08-22 DIAGNOSIS — G89.29 CHRONIC BILATERAL LOW BACK PAIN WITH LEFT-SIDED SCIATICA: ICD-10-CM

## 2023-08-22 DIAGNOSIS — M54.42 CHRONIC BILATERAL LOW BACK PAIN WITH LEFT-SIDED SCIATICA: ICD-10-CM

## 2023-08-22 DIAGNOSIS — I10 HYPERTENSION, UNSPECIFIED TYPE: Chronic | ICD-10-CM

## 2023-08-22 DIAGNOSIS — R00.2 PALPITATION: ICD-10-CM

## 2023-08-22 DIAGNOSIS — N92.6 IRREGULAR MENSES: Primary | ICD-10-CM

## 2023-08-22 DIAGNOSIS — D21.9 LEIOMYOMA: ICD-10-CM

## 2023-08-22 LAB
APPEARANCE UR: CLEAR
B-HCG FREE SERPL-ACNC: <2.42 MIU/ML
B-HCG UR QL: NEGATIVE
BACTERIA #/AREA URNS AUTO: ABNORMAL /HPF
BILIRUB UR QL STRIP.AUTO: NEGATIVE
COLOR UR: YELLOW
CTP QC/QA: YES
GLUCOSE UR QL STRIP.AUTO: NORMAL
HYALINE CASTS #/AREA URNS LPF: ABNORMAL /LPF
KETONES UR QL STRIP.AUTO: ABNORMAL
LEUKOCYTE ESTERASE UR QL STRIP.AUTO: NEGATIVE
MUCOUS THREADS URNS QL MICRO: ABNORMAL /LPF
NITRITE UR QL STRIP.AUTO: NEGATIVE
PH UR STRIP.AUTO: 5.5 [PH]
PROT UR QL STRIP.AUTO: ABNORMAL
RBC #/AREA URNS AUTO: ABNORMAL /HPF
RBC UR QL AUTO: ABNORMAL
SP GR UR STRIP.AUTO: 1.03
SQUAMOUS #/AREA URNS LPF: ABNORMAL /HPF
UROBILINOGEN UR STRIP-ACNC: NORMAL
WBC #/AREA URNS AUTO: ABNORMAL /HPF

## 2023-08-22 PROCEDURE — 3075F SYST BP GE 130 - 139MM HG: CPT | Mod: CPTII,,,

## 2023-08-22 PROCEDURE — 99214 PR OFFICE/OUTPT VISIT, EST, LEVL IV, 30-39 MIN: ICD-10-PCS | Mod: S$PBB,,,

## 2023-08-22 PROCEDURE — 3079F DIAST BP 80-89 MM HG: CPT | Mod: CPTII,,,

## 2023-08-22 PROCEDURE — 1160F PR REVIEW ALL MEDS BY PRESCRIBER/CLIN PHARMACIST DOCUMENTED: ICD-10-PCS | Mod: CPTII,,,

## 2023-08-22 PROCEDURE — 3008F PR BODY MASS INDEX (BMI) DOCUMENTED: ICD-10-PCS | Mod: CPTII,,,

## 2023-08-22 PROCEDURE — 3008F BODY MASS INDEX DOCD: CPT | Mod: CPTII,,,

## 2023-08-22 PROCEDURE — 3044F PR MOST RECENT HEMOGLOBIN A1C LEVEL <7.0%: ICD-10-PCS | Mod: CPTII,,,

## 2023-08-22 PROCEDURE — 1159F MED LIST DOCD IN RCRD: CPT | Mod: CPTII,,,

## 2023-08-22 PROCEDURE — 99214 OFFICE O/P EST MOD 30 MIN: CPT | Mod: S$PBB,,,

## 2023-08-22 PROCEDURE — 81001 URINALYSIS AUTO W/SCOPE: CPT

## 2023-08-22 PROCEDURE — 3044F HG A1C LEVEL LT 7.0%: CPT | Mod: CPTII,,,

## 2023-08-22 PROCEDURE — 3079F PR MOST RECENT DIASTOLIC BLOOD PRESSURE 80-89 MM HG: ICD-10-PCS | Mod: CPTII,,,

## 2023-08-22 PROCEDURE — 1159F PR MEDICATION LIST DOCUMENTED IN MEDICAL RECORD: ICD-10-PCS | Mod: CPTII,,,

## 2023-08-22 PROCEDURE — 1160F RVW MEDS BY RX/DR IN RCRD: CPT | Mod: CPTII,,,

## 2023-08-22 PROCEDURE — 3075F PR MOST RECENT SYSTOLIC BLOOD PRESS GE 130-139MM HG: ICD-10-PCS | Mod: CPTII,,,

## 2023-08-22 PROCEDURE — 81025 URINE PREGNANCY TEST: CPT | Mod: PBBFAC,PN

## 2023-08-22 PROCEDURE — 84702 CHORIONIC GONADOTROPIN TEST: CPT

## 2023-08-22 PROCEDURE — 36415 COLL VENOUS BLD VENIPUNCTURE: CPT

## 2023-08-22 PROCEDURE — 99215 OFFICE O/P EST HI 40 MIN: CPT | Mod: PBBFAC,PN

## 2023-08-22 RX ORDER — METHOCARBAMOL 750 MG/1
750 TABLET, FILM COATED ORAL 2 TIMES DAILY
COMMUNITY
Start: 2023-08-19

## 2023-08-22 RX ORDER — TRAZODONE HYDROCHLORIDE 50 MG/1
TABLET ORAL
COMMUNITY
Start: 2023-03-29

## 2023-08-22 RX ORDER — HYDROCODONE BITARTRATE AND ACETAMINOPHEN 5; 325 MG/1; MG/1
1 TABLET ORAL EVERY 6 HOURS PRN
COMMUNITY
Start: 2023-08-19

## 2023-08-22 RX ORDER — PREDNISONE 20 MG/1
TABLET ORAL
COMMUNITY
Start: 2023-08-19

## 2023-08-22 NOTE — PROGRESS NOTES
"Patient Name: Renee Anaya     : 1985    MRN: [id number]     Subjective:     Patient ID: Renee Anaya is a 38 y.o. female.    Chief Complaint:   Chief Complaint   Patient presents with    Follow-up     Menstrual cycle concerns. Requesting blood work for pregnancy. States home pregnancy test negative. States cycle was "late" Was supposed to start 2023, however, started 2023. C/o lower back pain.  C/o heart palpations- requesting results. C/o side pain/lower back.     Nausea        HPI: 2023:  Patient has appointment today that she self scheduled over concerns of irregular menses.  States that her cycles have become more and more irregular as of lately, she was supposed to start her cycle at the beginning of this month and it was about 8 days late.  States that she has since started her cycle but feels "off", states that she feels low back pain in her sciatic pain has flared up again since her cycle started.  Patient has not heard from urologist, will place uro gyn referral today to make sure she has follow-up of abnormal MRI.  Patient aware.  She denies any urinary complaints, denies increased urination, painful urination, burning urination, hesitancy dribbling or leakage.  Patient does endorse palpitations none currently in clinic today but states that they have not completely resolved, she never establish care with cardiologist and will be contacting clinic to follow-up on this workup. Patient denies chest pain and shortness of breath.  Patient denies fever, night sweats, chills, nausea, vomiting, diarrhea, constipation, weight loss, and changes in appetite.    2023:  Appointment today was to review CT abdomen and pelvis that was ordered to further assess echogenic structure in the posterior wall of the bladder, patient never completed this imaging.  Ensured that patient has 2. Scheduling department in order to have this imaging scheduled.  Discussed with patient's importance of " "having scheduled.  Additionally patient states that she has no back pain and has never completed physical therapy today.  Today she is complaining of an anterior wall chest pain, states that she went to [facility] who told her that she will need a cardiologist.  Denies HA, blurred vision, chest pain, SOB, palpitations or edema in clinic today. She endorses reducing tobacco use to help reduce cough, states she is coughing pretty frequently and it is increased at night.  Denies any fever, wheezing, sputum production, exposure to known illnesses.    01/24/2023: Patient reported to emergency department since last office visit for back pain, patient states that she "fell down due to weakness" that was caused by her back pain.  States that the emergency department on gave her muscle relaxers.  She is requesting further workup of his back pain.  She has not tried any formal physical therapy.  Patient denies any changes to bladder or bowel continence, saddle anesthesia.  Patient denies any known trauma or fall leading up to this instance.  Patient states she is still able to move around and has not been utilizing Flexeril.  Additionally since last office visit patient did complete pelvic ultrasound, this study showed solid echogenic structure in the posterior wall of the bladder that measures 1.7 x 1.8 x 2 cm exact nature of these abnormality cannot be ascertained by the US. Follow up imaging ordered and discussed with patient today.     01/05/2023:  Patient presents to clinic today to establish care, she was previously in [facility] primary care group.  Patient being managed for hypertension, insomnia, ADHD.  Patient states that her medications are effective dose currently.  Patient does not know when her last Pap smear was she does have a son who is 5 years old.  Patient is unsure of when her last wellness labs were drawn but states that she does not think she is ever had any thing wrong with her blood " "work.    Additionally today's biggest complaint is that she has extremely painful menstrual cycles and will have residual pelvic pain after bowel movements.  Patient states that there is no blood in her stool or urine.  Patient states that it has been this way for about 3 months consistently, previously it was off and on for about 4 years.  Patient further states that she had ovarian cyst between her 1st and 2nd child but states on a recent ultrasound they were unable to see cyst.  Patient states that intercourse is painful as well.  Patient does endorse malodorous urine, states it has been this way for about 2 weeks.  Endorses previous Trichomonas infections.        ROS:      Review of Systems   HENT:  Negative for hearing loss.    Eyes:  Negative for discharge.   Respiratory:  Negative for wheezing.    Cardiovascular:  Positive for palpitations. Negative for chest pain.   Gastrointestinal:  Negative for blood in stool, constipation, diarrhea and vomiting.   Genitourinary:  Negative for dysuria and hematuria.   Musculoskeletal:  Negative for neck pain.   Neurological:  Negative for weakness and headaches.   Endo/Heme/Allergies:  Negative for polydipsia.        12 point review of systems conducted, negative except as stated in the history of present illness. See HPI for details.    History:     Past Medical History:   Diagnosis Date    Hypertension         Past Surgical History:   Procedure Laterality Date     SECTION      TONSILLECTOMY         History reviewed. No pertinent family history.     Social History     Tobacco Use    Smoking status: Every Day     Current packs/day: 0.50     Types: Cigarettes    Smokeless tobacco: Never    Tobacco comments:     States smokes about 6 cigarettes a day   Substance and Sexual Activity    Alcohol use: Yes     Comment: "very seldom"    Drug use: Yes     Types: Marijuana    Sexual activity: Yes     Partners: Male       Current Outpatient Medications   Medication " "Instructions    albuterol (PROVENTIL HFA) 90 mcg/actuation inhaler 2 puffs, Inhalation, Every 6 hours PRN, Rescue    amLODIPine (NORVASC) 10 mg, Oral, Daily    atomoxetine (STRATTERA) 80 mg, Oral, Every morning    busPIRone (BUSPAR) 7.5-15 mg, Oral, 2 times daily    cetirizine (ZYRTEC) 10 mg, Oral    cyclobenzaprine (FLEXERIL) 10 MG tablet TAKE 1 TABLET BY MOUTH EVERY 8 HOURS FOR SPASM    HYDROcodone-acetaminophen (NORCO) 5-325 mg per tablet 1 tablet, Oral, Every 6 hours PRN    ibuprofen (ADVIL,MOTRIN) 600 MG tablet TAKE 1 TABLET BY MOUTH EVERY 6 HOURS FOR PAIN    methocarbamoL (ROBAXIN) 750 mg, Oral, 2 times daily    predniSONE (DELTASONE) 20 MG tablet Oral    traZODone (DESYREL) 50 MG tablet 0.5-2 tablets Orally Once a day prn insomnia for 30 day(s)        Review of patient's allergies indicates:   Allergen Reactions    Penicillins Hives, Itching and Shortness Of Breath    Tramadol Itching    Ibuprofen Nausea Only    Milk containing products (dairy) Nausea Only       Objective:     Visit Vitals  /87 (BP Location: Left arm, Patient Position: Sitting, BP Method: Medium (Manual))   Pulse 60   Temp 98.3 °F (36.8 °C) (Oral)   Resp 20   Ht 5' 4" (1.626 m)   Wt 90.6 kg (199 lb 12.8 oz)   LMP 08/08/2023 (Exact Date)   SpO2 99%   BMI 34.30 kg/m²       Physical Examination:     Physical Exam  Vitals reviewed.   Constitutional:       Appearance: Normal appearance. She is normal weight.   HENT:      Head: Normocephalic.      Right Ear: Tympanic membrane, ear canal and external ear normal.      Left Ear: Tympanic membrane, ear canal and external ear normal.      Nose: Nose normal.      Mouth/Throat:      Mouth: Mucous membranes are moist.      Pharynx: Oropharynx is clear.   Eyes:      Extraocular Movements: Extraocular movements intact.      Conjunctiva/sclera: Conjunctivae normal.      Pupils: Pupils are equal, round, and reactive to light.   Cardiovascular:      Rate and Rhythm: Normal rate and regular rhythm.      " Pulses: Normal pulses.      Heart sounds: Normal heart sounds.   Pulmonary:      Effort: Pulmonary effort is normal.      Breath sounds: Normal breath sounds.   Abdominal:      General: Abdomen is flat. Bowel sounds are normal.      Palpations: Abdomen is soft.      Tenderness: There is no abdominal tenderness. Negative signs include Ryan's sign, Rovsing's sign and McBurney's sign.   Musculoskeletal:      Cervical back: Normal range of motion and neck supple.      Lumbar back: Positive left straight leg raise test. Negative right straight leg raise test.   Skin:     General: Skin is warm and dry.   Neurological:      General: No focal deficit present.      Mental Status: She is alert and oriented to person, place, and time.   Psychiatric:         Mood and Affect: Mood normal.         Behavior: Behavior normal.         Lab Results:     Chemistry:  Lab Results   Component Value Date     01/05/2023    K 4.2 01/05/2023    CHLORIDE 106 01/05/2023    BUN 10.5 01/05/2023    CREATININE 0.75 01/05/2023    EGFRNORACEVR >90 01/05/2023    GLUCOSE 80 01/05/2023    CALCIUM 8.8 01/05/2023    ALKPHOS 86 01/05/2023    LABPROT 7.2 01/05/2023    ALBUMIN 3.8 01/05/2023    AST 12 01/05/2023    ALT 10 01/05/2023    TSH 0.657 01/05/2023    EJWUXU1SQBQ 0.87 01/05/2023        Lab Results   Component Value Date    HGBA1C 4.8 01/05/2023        Hematology:  Lab Results   Component Value Date    WBC 7.2 01/05/2023    HGB 12.8 01/05/2023    HCT 37.9 01/05/2023     01/05/2023       Lipid Panel:  Lab Results   Component Value Date    CHOL 113 01/05/2023    HDL 51 01/05/2023    LDL 54.00 01/05/2023    TRIG 42 01/05/2023    TOTALCHOLEST 2 01/05/2023        Urine:  Lab Results   Component Value Date    COLORUA Straw 01/05/2023    APPEARANCEUA Turbid (A) 01/05/2023    SGUA 1.023 01/05/2023    PHUA 5.5 01/05/2023    PROTEINUA Trace (A) 01/05/2023    GLUCOSEUA Normal 01/05/2023    KETONESUA Negative 01/05/2023    BLOODUA Trace (A)  01/05/2023    NITRITESUA Negative 01/05/2023    LEUKOCYTESUR Negative 01/05/2023    RBCUA None Seen 01/05/2023    WBCUA 0-5 01/05/2023    BACTERIA Trace (A) 01/05/2023    SQEPUA Trace 01/05/2023        Assessment:          ICD-10-CM ICD-9-CM   1. Irregular menses  N92.6 626.4   2. Chronic bilateral low back pain with left-sided sciatica  M54.42 724.2    G89.29 724.3     338.29   3. Leiomyoma  D21.9 215.9   4. Hypertension, unspecified type  I10 401.9   5. Palpitation  R00.2 785.1        Plan:     1. Irregular menses  Comments:  referral to Dr. Jacobs today, UPT and hcg quant today. She has since started her cycle  Orders:  -     HCG, Quantitative  -     POCT Urine Pregnancy  -     Ambulatory referral/consult to Gynecology; Future; Expected date: 08/22/2023    2. Chronic bilateral low back pain with left-sided sciatica  -     Urinalysis, Reflex to Urine Culture    3. Leiomyoma  Assessment & Plan:  MRI on 6/2/23 showed mural lesion at the dome of the urinary bladder corresponding with sonographic abnormality. Appearance is nonspecific. Lesion does appear to be submucosal suggesting potential bladder leiomyoma.  Recommend urology evaluation. Referral to Dr. Arguelles today    Patient was given the phone number to all providers that they were referred to today, encouraged patient to reach out to these providers directly should they not hear from them by next week.  Patient verbalized understanding of these directions.      Orders:  -     Ambulatory referral/consult to Urogynecology; Future; Expected date: 08/22/2023    4. Hypertension, unspecified type  Overview:  Low Sodium Diet (Dash Diet - less than 2 grams of sodium per day).  Monitor Blood Pressure daily and log. Report any consistent numbers greater than 140/90.  Smoking Cessation encouraged to aid in BP reduction.  Maintain healthy weight with goal BMI <30. Exercise 30 minutes per day 5 days per week      Assessment & Plan:  Stable and well controlled. Continue  current medication. Limit salt in diet. Monitor BP and notify clinic for consistently elevated BP >140/90.        5. Palpitation  Assessment & Plan:  Encouraged patient to contact Cardiology Clinic, she did have appointment in July to establish care but did not attend this appointment.  She will call them to reschedule this appointment.  States that she is been stable with palpitations notices them most when she lays down to sleep at night, discuss psycho logical etiology as a possibility.            Follow up if symptoms worsen or fail to improve, for Routine follow-up at previously scheduled appointment on September 27th.    Future Appointments   Date Time Provider Department Center   9/27/2023  9:15 AM Mariana Perez NP Count includes the [facility]        Mariana Perez NP

## 2023-08-22 NOTE — ASSESSMENT & PLAN NOTE
Stable and well controlled. Continue current medication. Limit salt in diet. Monitor BP and notify clinic for consistently elevated BP >140/90.

## 2023-08-22 NOTE — ASSESSMENT & PLAN NOTE
Encouraged patient to contact Cardiology Clinic, she did have appointment in July to establish care but did not attend this appointment.  She will call them to reschedule this appointment.  States that she is been stable with palpitations notices them most when she lays down to sleep at night, discuss psycho logical etiology as a possibility.

## 2023-08-22 NOTE — ASSESSMENT & PLAN NOTE
MRI on 6/2/23 showed mural lesion at the dome of the urinary bladder corresponding with sonographic abnormality. Appearance is nonspecific. Lesion does appear to be submucosal suggesting potential bladder leiomyoma.  Recommend urology evaluation. Referral to Dr. Arguelles today    Patient was given the phone number to all providers that they were referred to today, encouraged patient to reach out to these providers directly should they not hear from them by next week.  Patient verbalized understanding of these directions.

## 2023-08-23 ENCOUNTER — PATIENT MESSAGE (OUTPATIENT)
Dept: FAMILY MEDICINE | Facility: CLINIC | Age: 38
End: 2023-08-23
Payer: MEDICAID

## 2023-08-25 DIAGNOSIS — M54.42 CHRONIC BILATERAL LOW BACK PAIN WITH LEFT-SIDED SCIATICA: Primary | ICD-10-CM

## 2023-08-25 DIAGNOSIS — G89.29 CHRONIC BILATERAL LOW BACK PAIN WITH LEFT-SIDED SCIATICA: Primary | ICD-10-CM

## 2023-08-25 RX ORDER — GABAPENTIN 300 MG/1
300 CAPSULE ORAL 3 TIMES DAILY PRN
Qty: 90 CAPSULE | Refills: 2 | Status: SHIPPED | OUTPATIENT
Start: 2023-08-25 | End: 2023-11-23

## 2023-09-13 PROBLEM — N32.89 BLADDER MASS: Status: ACTIVE | Noted: 2023-09-13

## 2023-09-13 PROBLEM — N94.6 DYSMENORRHEA: Status: ACTIVE | Noted: 2023-09-13

## 2023-10-25 ENCOUNTER — TELEPHONE (OUTPATIENT)
Dept: FAMILY MEDICINE | Facility: CLINIC | Age: 38
End: 2023-10-25

## 2023-11-01 ENCOUNTER — OFFICE VISIT (OUTPATIENT)
Dept: FAMILY MEDICINE | Facility: CLINIC | Age: 38
End: 2023-11-01
Payer: MEDICAID

## 2023-11-01 ENCOUNTER — PATIENT MESSAGE (OUTPATIENT)
Dept: FAMILY MEDICINE | Facility: CLINIC | Age: 38
End: 2023-11-01

## 2023-11-01 VITALS
BODY MASS INDEX: 35.58 KG/M2 | DIASTOLIC BLOOD PRESSURE: 84 MMHG | OXYGEN SATURATION: 97 % | RESPIRATION RATE: 20 BRPM | HEIGHT: 64 IN | SYSTOLIC BLOOD PRESSURE: 135 MMHG | WEIGHT: 208.38 LBS | HEART RATE: 60 BPM | TEMPERATURE: 98 F

## 2023-11-01 DIAGNOSIS — Z00.00 ENCOUNTER FOR WELLNESS EXAMINATION: Primary | ICD-10-CM

## 2023-11-01 DIAGNOSIS — I10 HYPERTENSION, UNSPECIFIED TYPE: Chronic | ICD-10-CM

## 2023-11-01 DIAGNOSIS — H93.13 TINNITUS OF BOTH EARS: ICD-10-CM

## 2023-11-01 PROCEDURE — 99214 OFFICE O/P EST MOD 30 MIN: CPT | Mod: S$PBB,,,

## 2023-11-01 PROCEDURE — 3044F HG A1C LEVEL LT 7.0%: CPT | Mod: CPTII,,,

## 2023-11-01 PROCEDURE — 1159F MED LIST DOCD IN RCRD: CPT | Mod: CPTII,,,

## 2023-11-01 PROCEDURE — 1160F PR REVIEW ALL MEDS BY PRESCRIBER/CLIN PHARMACIST DOCUMENTED: ICD-10-PCS | Mod: CPTII,,,

## 2023-11-01 PROCEDURE — 3008F BODY MASS INDEX DOCD: CPT | Mod: CPTII,,,

## 2023-11-01 PROCEDURE — 99214 PR OFFICE/OUTPT VISIT, EST, LEVL IV, 30-39 MIN: ICD-10-PCS | Mod: S$PBB,,,

## 2023-11-01 PROCEDURE — 1160F RVW MEDS BY RX/DR IN RCRD: CPT | Mod: CPTII,,,

## 2023-11-01 PROCEDURE — 1159F PR MEDICATION LIST DOCUMENTED IN MEDICAL RECORD: ICD-10-PCS | Mod: CPTII,,,

## 2023-11-01 PROCEDURE — 3044F PR MOST RECENT HEMOGLOBIN A1C LEVEL <7.0%: ICD-10-PCS | Mod: CPTII,,,

## 2023-11-01 PROCEDURE — 3008F PR BODY MASS INDEX (BMI) DOCUMENTED: ICD-10-PCS | Mod: CPTII,,,

## 2023-11-01 PROCEDURE — 99215 OFFICE O/P EST HI 40 MIN: CPT | Mod: PBBFAC,PN

## 2023-11-01 PROCEDURE — 3079F PR MOST RECENT DIASTOLIC BLOOD PRESSURE 80-89 MM HG: ICD-10-PCS | Mod: CPTII,,,

## 2023-11-01 PROCEDURE — 3075F SYST BP GE 130 - 139MM HG: CPT | Mod: CPTII,,,

## 2023-11-01 PROCEDURE — 3075F PR MOST RECENT SYSTOLIC BLOOD PRESS GE 130-139MM HG: ICD-10-PCS | Mod: CPTII,,,

## 2023-11-01 PROCEDURE — 3079F DIAST BP 80-89 MM HG: CPT | Mod: CPTII,,,

## 2023-11-01 RX ORDER — AZITHROMYCIN 250 MG/1
TABLET, FILM COATED ORAL
Qty: 6 TABLET | Refills: 0 | Status: SHIPPED | OUTPATIENT
Start: 2023-11-01 | End: 2023-11-06

## 2023-11-01 RX ORDER — CETIRIZINE HYDROCHLORIDE 10 MG/1
10 TABLET ORAL NIGHTLY
Qty: 90 TABLET | Refills: 3 | Status: SHIPPED | OUTPATIENT
Start: 2023-11-01 | End: 2024-10-31

## 2023-11-01 NOTE — PROGRESS NOTES
"Patient Name: Renee Anaya     : 1985    MRN: [id number]     Subjective:     Patient ID: Renee Anaya is a 38 y.o. female.    Chief Complaint:   Chief Complaint   Patient presents with    Follow-up     6 month f/u. C/o ringing in ear. States is having surgery in December for her endometriosis. Needs med. Refill on claritin.        HPI: 2023:  Patient presents today for routine follow-up on labs, patient has since establish with her gynecologist.  This provider started patient on Provera daily until surgery, she was scheduled for procedure possibly happening on  should provider find a uro gynecologist to assist.  Is scheduled for preop on .  She is also complaining of a feeling of fullness in her bilateral ears and chronic tinnitus, states that she is been hearing ringing in her ears for many years, states that her right ear is worse than her left.  Notices it at most when she is going to bed at night.  States that it is intermittent, occasionally sounds almost like a heartbeat.  States she checks blood pressure at times of the sensations and denies hypertensive readings.  Normotensive in clinic today.  Endorses going to concerts and night clubs in her youth and standing by loud speakers.  Patient denies chest pain, palpitations, and shortness of breath.  Patient denies fever, night sweats, chills, nausea, vomiting, diarrhea, constipation, weight loss, and changes in appetite.    2023:  Patient has appointment today that she self scheduled over concerns of irregular menses.  States that her cycles have become more and more irregular as of lately, she was supposed to start her cycle at the beginning of this month and it was about 8 days late.  States that she has since started her cycle but feels "off", states that she feels low back pain in her sciatic pain has flared up again since her cycle started.  Patient has not heard from urologist, will place uro gyn referral " "today to make sure she has follow-up of abnormal MRI.  Patient aware.  She denies any urinary complaints, denies increased urination, painful urination, burning urination, hesitancy dribbling or leakage.  Patient does endorse palpitations none currently in clinic today but states that they have not completely resolved, she never establish care with cardiologist and will be contacting clinic to follow-up on this workup. Patient denies chest pain and shortness of breath.  Patient denies fever, night sweats, chills, nausea, vomiting, diarrhea, constipation, weight loss, and changes in appetite.    03/27/2023:  Appointment today was to review CT abdomen and pelvis that was ordered to further assess echogenic structure in the posterior wall of the bladder, patient never completed this imaging.  Ensured that patient has 2. Scheduling department in order to have this imaging scheduled.  Discussed with patient's importance of having scheduled.  Additionally patient states that she has no back pain and has never completed physical therapy today.  Today she is complaining of an anterior wall chest pain, states that she went to [facility] who told her that she will need a cardiologist.  Denies HA, blurred vision, chest pain, SOB, palpitations or edema in clinic today. She endorses reducing tobacco use to help reduce cough, states she is coughing pretty frequently and it is increased at night.  Denies any fever, wheezing, sputum production, exposure to known illnesses.    01/24/2023: Patient reported to emergency department since last office visit for back pain, patient states that she "fell down due to weakness" that was caused by her back pain.  States that the emergency department on gave her muscle relaxers.  She is requesting further workup of his back pain.  She has not tried any formal physical therapy.  Patient denies any changes to bladder or bowel continence, saddle anesthesia.  Patient denies any known " trauma or fall leading up to this instance.  Patient states she is still able to move around and has not been utilizing Flexeril.  Additionally since last office visit patient did complete pelvic ultrasound, this study showed solid echogenic structure in the posterior wall of the bladder that measures 1.7 x 1.8 x 2 cm exact nature of these abnormality cannot be ascertained by the US. Follow up imaging ordered and discussed with patient today.     01/05/2023:  Patient presents to clinic today to establish care, she was previously in [facility] primary care group.  Patient being managed for hypertension, insomnia, ADHD.  Patient states that her medications are effective dose currently.  Patient does not know when her last Pap smear was she does have a son who is 5 years old.  Patient is unsure of when her last wellness labs were drawn but states that she does not think she is ever had any thing wrong with her blood work.    Additionally today's biggest complaint is that she has extremely painful menstrual cycles and will have residual pelvic pain after bowel movements.  Patient states that there is no blood in her stool or urine.  Patient states that it has been this way for about 3 months consistently, previously it was off and on for about 4 years.  Patient further states that she had ovarian cyst between her 1st and 2nd child but states on a recent ultrasound they were unable to see cyst.  Patient states that intercourse is painful as well.  Patient does endorse malodorous urine, states it has been this way for about 2 weeks.  Endorses previous Trichomonas infections.        ROS:      12 point review of systems conducted, negative except as stated in the history of present illness. See HPI for details.    History:     Past Medical History:   Diagnosis Date    Anemia     Anxiety     Depression     Hormone disorder     Hypertension     Menstrual symptom or sign     PID (pelvic inflammatory disease)     STD  "(sexually transmitted disease)         Past Surgical History:   Procedure Laterality Date     SECTION      TONSILLECTOMY         Family History   Problem Relation Age of Onset    Cancer Mother     Diabetes Mother     Diabetes Father     Cancer Maternal Grandfather     Diabetes Sister     Hypertension Maternal Aunt         Social History     Tobacco Use    Smoking status: Every Day     Current packs/day: 0.50     Average packs/day: 0.5 packs/day for 15.0 years (7.5 ttl pk-yrs)     Types: Cigarettes    Smokeless tobacco: Never    Tobacco comments:     States smokes about 6 cigarettes a day   Substance and Sexual Activity    Alcohol use: Yes     Comment: "very seldom"    Drug use: Yes     Types: Marijuana    Sexual activity: Yes     Partners: Male       Current Outpatient Medications   Medication Instructions    albuterol (PROVENTIL HFA) 90 mcg/actuation inhaler 2 puffs, Inhalation, Every 6 hours PRN, Rescue    amLODIPine (NORVASC) 10 mg, Oral, Daily    atomoxetine (STRATTERA) 80 mg, Oral, Every morning    azithromycin (Z-JAMILA) 250 MG tablet Take 2 tablets by mouth on day 1; Take 1 tablet by mouth on days 2-5<BR>    busPIRone (BUSPAR) 7.5-15 mg, Oral, 2 times daily    cetirizine (ZYRTEC) 10 mg, Oral, Nightly    cyclobenzaprine (FLEXERIL) 10 MG tablet TAKE 1 TABLET BY MOUTH EVERY 8 HOURS FOR SPASM    gabapentin (NEURONTIN) 300 mg, Oral, 3 times daily PRN    HYDROcodone-acetaminophen (NORCO) 5-325 mg per tablet 1 tablet, Oral, Every 6 hours PRN    ibuprofen (ADVIL,MOTRIN) 600 MG tablet TAKE 1 TABLET BY MOUTH EVERY 6 HOURS FOR PAIN    medroxyPROGESTERone (PROVERA) 10 mg, Oral, Daily    methocarbamoL (ROBAXIN) 750 mg, Oral, 2 times daily    predniSONE (DELTASONE) 20 MG tablet Oral    traZODone (DESYREL) 50 MG tablet 0.5-2 tablets Orally Once a day prn insomnia for 30 day(s)        Review of patient's allergies indicates:   Allergen Reactions    Penicillins Hives, Itching and Shortness Of Breath    Tramadol Itching " "   Ibuprofen Nausea Only    Milk containing products (dairy) Nausea Only       Objective:     Visit Vitals  /84 (BP Location: Left arm, Patient Position: Sitting, BP Method: Medium (Manual))   Pulse 60   Temp 98.4 °F (36.9 °C) (Oral)   Resp 20   Ht 5' 4" (1.626 m)   Wt 94.5 kg (208 lb 6.4 oz)   LMP 09/13/2023 (Approximate)   SpO2 97%   BMI 35.77 kg/m²       Physical Examination:     Physical Exam  Constitutional:       General: She is not in acute distress.     Appearance: Normal appearance. She is not ill-appearing.   HENT:      Right Ear: Ear canal and external ear normal. Decreased hearing noted. There is no impacted cerumen.      Left Ear: Ear canal and external ear normal. No decreased hearing noted. There is no impacted cerumen.      Nose: Congestion present.      Mouth/Throat:      Pharynx: No posterior oropharyngeal erythema.   Cardiovascular:      Rate and Rhythm: Normal rate and regular rhythm.      Heart sounds: Normal heart sounds.   Pulmonary:      Effort: Pulmonary effort is normal. No respiratory distress.      Breath sounds: Normal breath sounds.   Musculoskeletal:      Cervical back: Normal range of motion.   Skin:     General: Skin is warm and dry.   Neurological:      Mental Status: She is alert and oriented to person, place, and time.   Psychiatric:         Mood and Affect: Mood normal.         Behavior: Behavior normal.         Lab Results:     Chemistry:  Lab Results   Component Value Date     01/05/2023    K 4.2 01/05/2023    CHLORIDE 106 01/05/2023    BUN 10.5 01/05/2023    CREATININE 0.75 01/05/2023    EGFRNORACEVR >90 01/05/2023    GLUCOSE 80 01/05/2023    CALCIUM 8.8 01/05/2023    ALKPHOS 86 01/05/2023    LABPROT 7.2 01/05/2023    ALBUMIN 3.8 01/05/2023    AST 12 01/05/2023    ALT 10 01/05/2023    TSH 0.657 01/05/2023    WOIHTC1QAPX 0.87 01/05/2023        Lab Results   Component Value Date    HGBA1C 4.8 01/05/2023        Hematology:  Lab Results   Component Value Date    WBC " 7.2 01/05/2023    HGB 12.8 01/05/2023    HCT 37.9 01/05/2023     01/05/2023       Lipid Panel:  Lab Results   Component Value Date    CHOL 113 01/05/2023    HDL 51 01/05/2023    LDL 54.00 01/05/2023    TRIG 42 01/05/2023    TOTALCHOLEST 2 01/05/2023        Urine:  Lab Results   Component Value Date    COLORUA Yellow 08/22/2023    APPEARANCEUA Clear 08/22/2023    SGUA 1.031 08/22/2023    PHUA 5.5 08/22/2023    PROTEINUA 1+ (A) 08/22/2023    GLUCOSEUA Normal 08/22/2023    KETONESUA 1+ (A) 08/22/2023    BLOODUA 1+ (A) 08/22/2023    NITRITESUA Negative 08/22/2023    LEUKOCYTESUR Negative 08/22/2023    RBCUA 0-5 08/22/2023    WBCUA 0-5 08/22/2023    BACTERIA None Seen 08/22/2023    SQEPUA Few (A) 08/22/2023    HYALINECASTS 0-2 (A) 08/22/2023        Assessment:          ICD-10-CM ICD-9-CM   1. Encounter for wellness examination  Z00.00 V70.0   2. Tinnitus of both ears  H93.13 388.30   3. Hypertension, unspecified type  I10 401.9        Plan:     1. Encounter for wellness examination  -     TSH; Future; Expected date: 11/01/2023  -     T4, Free; Future; Expected date: 11/01/2023  -     Hemoglobin A1C; Future; Expected date: 11/01/2023  -     Lipid Panel; Future; Expected date: 11/01/2023  -     CBC Auto Differential; Future; Expected date: 11/01/2023  -     Comprehensive Metabolic Panel; Future; Expected date: 11/01/2023  -     Vitamin D; Future; Expected date: 11/01/2023  -     Urinalysis, Reflex to Urine Culture; Future; Expected date: 11/01/2023    2. Tinnitus of both ears  -     Ambulatory referral/consult to Audiology; Future; Expected date: 11/01/2023    3. Hypertension, unspecified type  Overview:  Low Sodium Diet (Dash Diet - less than 2 grams of sodium per day).  Monitor Blood Pressure daily and log. Report any consistent numbers greater than 140/90.  Smoking Cessation encouraged to aid in BP reduction.  Maintain healthy weight with goal BMI <30. Exercise 30 minutes per day 5 days per week      Assessment &  Plan:  Stable and well controlled. Continue current medication. Limit salt in diet. Monitor BP and notify clinic for consistently elevated BP >140/90.        Other orders  -     cetirizine (ZYRTEC) 10 MG tablet; Take 1 tablet (10 mg total) by mouth every evening.  Dispense: 90 tablet; Refill: 3  -     azithromycin (Z-JAMILA) 250 MG tablet; Take 2 tablets by mouth on day 1; Take 1 tablet by mouth on days 2-5  Dispense: 6 tablet; Refill: 0         Follow up in about 6 months (around 5/1/2024).    Future Appointments   Date Time Provider Department Center   12/21/2023 10:15 AM Antwan Jacobs MD [facility]   5/1/2024  7:00 AM [facility]        Mariana Perez NP

## 2024-02-26 ENCOUNTER — TELEPHONE (OUTPATIENT)
Dept: FAMILY MEDICINE | Facility: CLINIC | Age: 39
End: 2024-02-26
Payer: MEDICAID

## 2024-02-26 NOTE — TELEPHONE ENCOUNTER
Called and spoke with patient. Verified . Patient states needs a new referral to a Urogyn for the Dx of Endometriosis. States Dr. Jacobs is unable to perform the surgery. States Dr. Arguelles does not take her insurance.

## 2024-04-05 ENCOUNTER — TELEPHONE (OUTPATIENT)
Dept: FAMILY MEDICINE | Facility: CLINIC | Age: 39
End: 2024-04-05
Payer: MEDICAID

## 2024-04-05 DIAGNOSIS — G89.29 CHRONIC BILATERAL LOW BACK PAIN WITH LEFT-SIDED SCIATICA: ICD-10-CM

## 2024-04-05 DIAGNOSIS — M54.42 CHRONIC BILATERAL LOW BACK PAIN WITH LEFT-SIDED SCIATICA: ICD-10-CM

## 2024-04-09 RX ORDER — GABAPENTIN 300 MG/1
600 CAPSULE ORAL 3 TIMES DAILY PRN
Qty: 180 CAPSULE | Refills: 2 | Status: SHIPPED | OUTPATIENT
Start: 2024-04-09 | End: 2024-07-08

## 2024-04-09 NOTE — TELEPHONE ENCOUNTER
"Called and spoke with patient. Verified . Rx medication instructions given. Verbalized understanding. States does want to be re-referred to PT. Requesting something close to her in [place]. [facility]  "

## 2024-04-09 NOTE — TELEPHONE ENCOUNTER
"Called and spoke with patient. Verified . States gabapentin "doesn't last long" therefore was taking two 300mg TID. States ran out. States gabapentin 300mg two TID "does help" c/o left back/side pain, and sciatic pain to bilateral legs.   "

## 2024-04-25 DIAGNOSIS — D21.9 LEIOMYOMA: Primary | ICD-10-CM

## 2024-05-13 ENCOUNTER — TELEPHONE (OUTPATIENT)
Dept: FAMILY MEDICINE | Facility: CLINIC | Age: 39
End: 2024-05-13
Payer: MEDICAID

## 2024-05-13 DIAGNOSIS — D21.9 LEIOMYOMA: Primary | ICD-10-CM

## 2024-05-14 NOTE — TELEPHONE ENCOUNTER
----- Message from Orquidea Ron LPN sent at 5/13/2024  1:53 PM CDT -----  Regarding: FW: Referral    ----- Message -----  From: Pauline Perez  Sent: 5/13/2024   1:38 PM CDT  To: Rachael Peña Staff  Subject: Referral                                         Pt found a urologist that accepts her insurance, she is requesting a referral to Jacques Paniagua at [facility] urology. Phone number is [phone] and fax number is [phone]. Address is [address] [place] [id number]

## 2024-05-16 ENCOUNTER — OFFICE VISIT (OUTPATIENT)
Dept: FAMILY MEDICINE | Facility: CLINIC | Age: 39
End: 2024-05-16
Payer: MEDICAID

## 2024-05-16 VITALS
SYSTOLIC BLOOD PRESSURE: 140 MMHG | HEIGHT: 64 IN | HEART RATE: 66 BPM | WEIGHT: 214.13 LBS | DIASTOLIC BLOOD PRESSURE: 89 MMHG | RESPIRATION RATE: 18 BRPM | TEMPERATURE: 98 F | OXYGEN SATURATION: 97 % | BODY MASS INDEX: 36.56 KG/M2

## 2024-05-16 DIAGNOSIS — D21.9 LEIOMYOMA: ICD-10-CM

## 2024-05-16 DIAGNOSIS — M54.42 CHRONIC BILATERAL LOW BACK PAIN WITH LEFT-SIDED SCIATICA: ICD-10-CM

## 2024-05-16 DIAGNOSIS — G47.00 INSOMNIA, UNSPECIFIED TYPE: ICD-10-CM

## 2024-05-16 DIAGNOSIS — I10 HYPERTENSION, UNSPECIFIED TYPE: Primary | ICD-10-CM

## 2024-05-16 DIAGNOSIS — R10.2 PELVIC PAIN: ICD-10-CM

## 2024-05-16 DIAGNOSIS — G89.29 CHRONIC BILATERAL LOW BACK PAIN WITH LEFT-SIDED SCIATICA: ICD-10-CM

## 2024-05-16 PROCEDURE — 99214 OFFICE O/P EST MOD 30 MIN: CPT | Mod: PBBFAC,PN

## 2024-05-16 PROCEDURE — 3075F SYST BP GE 130 - 139MM HG: CPT | Mod: CPTII,,,

## 2024-05-16 PROCEDURE — 99214 OFFICE O/P EST MOD 30 MIN: CPT | Mod: S$PBB,,,

## 2024-05-16 PROCEDURE — 3079F DIAST BP 80-89 MM HG: CPT | Mod: CPTII,,,

## 2024-05-16 PROCEDURE — 1160F RVW MEDS BY RX/DR IN RCRD: CPT | Mod: CPTII,,,

## 2024-05-16 PROCEDURE — 3008F BODY MASS INDEX DOCD: CPT | Mod: CPTII,,,

## 2024-05-16 PROCEDURE — 1159F MED LIST DOCD IN RCRD: CPT | Mod: CPTII,,,

## 2024-05-16 RX ORDER — DOXEPIN 6 MG/1
6 TABLET, FILM COATED ORAL NIGHTLY
Qty: 30 TABLET | Refills: 0 | Status: SHIPPED | OUTPATIENT
Start: 2024-05-16 | End: 2024-06-15

## 2024-05-16 RX ORDER — NIFEDIPINE 60 MG/1
60 TABLET, EXTENDED RELEASE ORAL DAILY
Qty: 90 TABLET | Refills: 0 | Status: SHIPPED | OUTPATIENT
Start: 2024-05-16 | End: 2024-08-14

## 2024-05-16 RX ORDER — HYDROCODONE BITARTRATE AND ACETAMINOPHEN 10; 325 MG/1; MG/1
1 TABLET ORAL EVERY 8 HOURS PRN
Qty: 21 TABLET | Refills: 0 | Status: SHIPPED | OUTPATIENT
Start: 2024-05-16 | End: 2024-05-23

## 2024-05-17 NOTE — ASSESSMENT & PLAN NOTE
Lower back stretches daily.  Avoid strenuous lifting, use proper body mechanics.  Heating pad, Ice pack, Biofreeze or Epsom salt baths as needed.  Exercise to strengthen core muscles to support your back.  PT Referral declined    PRN medication, discussed one time dispense for increased/ breakthrough pain

## 2024-05-17 NOTE — ASSESSMENT & PLAN NOTE
Stable and well controlled. Would like to stop amlodipine due to not controlling BP, stop Norvasc, start nifedipine 60 mg daily, Limit salt in diet. Monitor BP and notify clinic for consistently elevated BP >140/90.

## 2024-05-17 NOTE — PROGRESS NOTES
Patient Name: Renee Anaya     : 1985    MRN: [id number]     Subjective:     Patient ID: Renee Anaya is a 38 y.o. female.    Chief Complaint:   Chief Complaint   Patient presents with    Follow-up     6 month f/u. C/o insomnia. States has trouble falling asleep. Reports trazodone not effective. Requesting to increase or change Rx for BP (Norvasc)        HPI: 2024: today  patient is complaining of worsening insomnia and pelvic pain. States has follow up with gynecologist and urologist in the coming month with external urologist, surgery that was plained for 2023 was cancelled. States normally controlled with NSAIDs and gabapentin but does have occasional increase in chronic pain. No red flag symptoms. Also states trazodone is no longer effective for insomnia. Sates is able to sleep without medication some nights but not every night. Patient denies chest pain, palpitations, and shortness of breath.  Patient denies fever, night sweats, chills, nausea, vomiting, diarrhea, constipation, weight loss, and changes in appetite.    2023:  Patient presents today for routine follow-up on labs, patient has since establish with her gynecologist.  This provider started patient on Provera daily until surgery, she was scheduled for procedure possibly happening on  should provider find a uro gynecologist to assist.  Is scheduled for preop on .  She is also complaining of a feeling of fullness in her bilateral ears and chronic tinnitus, states that she is been hearing ringing in her ears for many years, states that her right ear is worse than her left.  Notices it at most when she is going to bed at night.  States that it is intermittent, occasionally sounds almost like a heartbeat.  States she checks blood pressure at times of the sensations and denies hypertensive readings.  Normotensive in clinic today.  Endorses going to concerts and night clubs in her youth and standing  "by loud speakers.      08/22/2023:  Patient has appointment today that she self scheduled over concerns of irregular menses.  States that her cycles have become more and more irregular as of lately, she was supposed to start her cycle at the beginning of this month and it was about 8 days late.  States that she has since started her cycle but feels "off", states that she feels low back pain in her sciatic pain has flared up again since her cycle started.  Patient has not heard from urologist, will place uro gyn referral today to make sure she has follow-up of abnormal MRI.  Patient aware.  She denies any urinary complaints, denies increased urination, painful urination, burning urination, hesitancy dribbling or leakage.  Patient does endorse palpitations none currently in clinic today but states that they have not completely resolved, she never establish care with cardiologist and will be contacting clinic to follow-up on this workup. Patient denies chest pain and shortness of breath.      03/27/2023:  Appointment today was to review CT abdomen and pelvis that was ordered to further assess echogenic structure in the posterior wall of the bladder, patient never completed this imaging.  Ensured that patient has 2. Scheduling department in order to have this imaging scheduled.  Discussed with patient's importance of having scheduled.  Additionally patient states that she has no back pain and has never completed physical therapy today.  Today she is complaining of an anterior wall chest pain, states that she went to [facility] who told her that she will need a cardiologist.  Denies HA, blurred vision, chest pain, SOB, palpitations or edema in clinic today. She endorses reducing tobacco use to help reduce cough, states she is coughing pretty frequently and it is increased at night.  Denies any fever, wheezing, sputum production, exposure to known illnesses.    01/24/2023: Patient reported to emergency " "department since last office visit for back pain, patient states that she "fell down due to weakness" that was caused by her back pain.  States that the emergency department on gave her muscle relaxers.  She is requesting further workup of his back pain.  She has not tried any formal physical therapy.  Patient denies any changes to bladder or bowel continence, saddle anesthesia.  Patient denies any known trauma or fall leading up to this instance.  Patient states she is still able to move around and has not been utilizing Flexeril.  Additionally since last office visit patient did complete pelvic ultrasound, this study showed solid echogenic structure in the posterior wall of the bladder that measures 1.7 x 1.8 x 2 cm exact nature of these abnormality cannot be ascertained by the US. Follow up imaging ordered and discussed with patient today.     01/05/2023:  Patient presents to clinic today to establish care, she was previously in [facility] primary care group.  Patient being managed for hypertension, insomnia, ADHD.  Patient states that her medications are effective dose currently.  Patient does not know when her last Pap smear was she does have a son who is 5 years old.  Patient is unsure of when her last wellness labs were drawn but states that she does not think she is ever had any thing wrong with her blood work.    Additionally today's biggest complaint is that she has extremely painful menstrual cycles and will have residual pelvic pain after bowel movements.  Patient states that there is no blood in her stool or urine.  Patient states that it has been this way for about 3 months consistently, previously it was off and on for about 4 years.  Patient further states that she had ovarian cyst between her 1st and 2nd child but states on a recent ultrasound they were unable to see cyst.  Patient states that intercourse is painful as well.  Patient does endorse malodorous urine, states it has been this " "way for about 2 weeks.  Endorses previous Trichomonas infections.        ROS:      12 point review of systems conducted, negative except as stated in the history of present illness. See HPI for details.    History:     Past Medical History:   Diagnosis Date    Anemia     Anxiety     Depression     Hormone disorder     Hypertension     Menstrual symptom or sign     PID (pelvic inflammatory disease)     STD (sexually transmitted disease)         Past Surgical History:   Procedure Laterality Date     SECTION      TONSILLECTOMY         Family History   Problem Relation Name Age of Onset    Cancer Mother Lanny malone     Diabetes Mother Lanny malone     Diabetes Father Chaim Anaya     Cancer Maternal Grandfather Vj Basilio     Diabetes Sister Camila Basilio     Hypertension Maternal Aunt Raquel basilio         Social History     Tobacco Use    Smoking status: Every Day     Current packs/day: 0.50     Average packs/day: 0.5 packs/day for 15.0 years (7.5 ttl pk-yrs)     Types: Cigarettes    Smokeless tobacco: Never    Tobacco comments:     States smokes about 6 cigarettes a day   Substance and Sexual Activity    Alcohol use: Yes     Comment: "very seldom"    Drug use: Yes     Types: Marijuana    Sexual activity: Yes     Partners: Male       Current Outpatient Medications   Medication Instructions    albuterol (PROVENTIL HFA) 90 mcg/actuation inhaler 2 puffs, Inhalation, Every 6 hours PRN, Rescue    cetirizine (ZYRTEC) 10 mg, Oral, Nightly    doxepin (SILENOR) 6 mg, Oral, Nightly    gabapentin (NEURONTIN) 600 mg, Oral, 3 times daily PRN    HYDROcodone-acetaminophen (NORCO)  mg per tablet 1 tablet, Oral, Every 8 hours PRN    NIFEdipine (PROCARDIA-XL) 60 mg, Oral, Daily        Review of patient's allergies indicates:   Allergen Reactions    Penicillins Hives, Itching and Shortness Of Breath    Tramadol Itching    Ibuprofen Nausea Only    Milk containing products (dairy) Nausea Only       Objective: " "    Visit Vitals  BP (!) 140/89 (BP Location: Left arm, Patient Position: Sitting)   Pulse 66   Temp 98.4 °F (36.9 °C) (Oral)   Resp 18   Ht 5' 4" (1.626 m)   Wt 97.1 kg (214 lb 1.6 oz)   LMP 05/06/2024   SpO2 97%   BMI 36.75 kg/m²       Physical Examination:     Physical Exam  Constitutional:       General: She is not in acute distress.     Appearance: Normal appearance. She is not ill-appearing.   Cardiovascular:      Rate and Rhythm: Normal rate and regular rhythm.      Heart sounds: Normal heart sounds.   Pulmonary:      Effort: Pulmonary effort is normal. No respiratory distress.      Breath sounds: Normal breath sounds.   Musculoskeletal:      Cervical back: Normal range of motion.   Skin:     General: Skin is warm and dry.   Neurological:      Mental Status: She is alert and oriented to person, place, and time.   Psychiatric:         Mood and Affect: Mood normal.         Behavior: Behavior normal.         Lab Results:     Chemistry:  Lab Results   Component Value Date     01/05/2023    K 4.2 01/05/2023    CHLORIDE 106 01/05/2023    BUN 10.5 01/05/2023    CREATININE 0.75 01/05/2023    EGFRNORACEVR >90 01/05/2023    GLUCOSE 80 01/05/2023    CALCIUM 8.8 01/05/2023    ALKPHOS 86 01/05/2023    LABPROT 7.2 01/05/2023    ALBUMIN 3.8 01/05/2023    AST 12 01/05/2023    ALT 10 01/05/2023    TSH 0.657 01/05/2023    JEBNNT1DBRZ 0.87 01/05/2023        Lab Results   Component Value Date    HGBA1C 4.8 01/05/2023        Hematology:  Lab Results   Component Value Date    WBC 7.2 01/05/2023    HGB 12.8 01/05/2023    HCT 37.9 01/05/2023     01/05/2023       Lipid Panel:  Lab Results   Component Value Date    CHOL 113 01/05/2023    HDL 51 01/05/2023    LDL 54.00 01/05/2023    TRIG 42 01/05/2023    TOTALCHOLEST 2 01/05/2023        Urine:  Lab Results   Component Value Date    COLORUA Yellow 08/22/2023    APPEARANCEUA Clear 08/22/2023    SGUA 1.031 08/22/2023    PHUA 5.5 08/22/2023    PROTEINUA 1+ (A) 08/22/2023    " GLUCOSEUA Normal 08/22/2023    KETONESUA 1+ (A) 08/22/2023    BLOODUA 1+ (A) 08/22/2023    NITRITESUA Negative 08/22/2023    LEUKOCYTESUR Negative 08/22/2023    RBCUA 0-5 08/22/2023    WBCUA 0-5 08/22/2023    BACTERIA None Seen 08/22/2023    SQEPUA Few (A) 08/22/2023    HYALINECASTS 0-2 (A) 08/22/2023        Assessment:          ICD-10-CM ICD-9-CM   1. Hypertension, unspecified type  I10 401.9   2. Insomnia, unspecified type  G47.00 780.52   3. Chronic bilateral low back pain with left-sided sciatica  M54.42 724.2    G89.29 724.3     338.29   4. Leiomyoma  D21.9 215.9   5. Pelvic pain  R10.2 [id number]        Plan:     1. Hypertension, unspecified type  Overview:  Low Sodium Diet (Dash Diet - less than 2 grams of sodium per day).  Monitor Blood Pressure daily and log. Report any consistent numbers greater than 140/90.  Smoking Cessation encouraged to aid in BP reduction.  Maintain healthy weight with goal BMI <30. Exercise 30 minutes per day 5 days per week      Assessment & Plan:  Stable and well controlled. Would like to stop amlodipine due to not controlling BP, stop Norvasc, start nifedipine 60 mg daily, Limit salt in diet. Monitor BP and notify clinic for consistently elevated BP >140/90.      Orders:  -     NIFEdipine (PROCARDIA-XL) 60 MG (OSM) 24 hr tablet; Take 1 tablet (60 mg total) by mouth once daily.  Dispense: 90 tablet; Refill: 0    2. Insomnia, unspecified type  Comments:  trial doxepin 6 mg nightly as need. rtc one month to follow up on efficacy.  Orders:  -     doxepin (SILENOR) 6 mg Tab; Take 6 mg by mouth every evening.  Dispense: 30 tablet; Refill: 0    3. Chronic bilateral low back pain with left-sided sciatica  Assessment & Plan:  Lower back stretches daily.  Avoid strenuous lifting, use proper body mechanics.  Heating pad, Ice pack, Biofreeze or Epsom salt baths as needed.  Exercise to strengthen core muscles to support your back.  PT Referral declined    PRN medication, discussed one time  dispense for increased/ breakthrough pain        Orders:  -     HYDROcodone-acetaminophen (NORCO)  mg per tablet; Take 1 tablet by mouth every 8 (eight) hours as needed for Pain.  Dispense: 21 tablet; Refill: 0    4. Leiomyoma  -     HYDROcodone-acetaminophen (NORCO)  mg per tablet; Take 1 tablet by mouth every 8 (eight) hours as needed for Pain.  Dispense: 21 tablet; Refill: 0    5. Pelvic pain  Assessment & Plan:  Keep appointment to establish care with urologist Dr. Jacques Paniagua.    Discussed one time dispense for increased/ breakthrough pain           Rtc 4 months to assess efficacy of medications.    Future Appointments   Date Time Provider Department Center   6/13/2024 12:00 PM Mariana Perez NP [facility]        Mariana Perez NP

## 2024-05-17 NOTE — ASSESSMENT & PLAN NOTE
Keep appointment to establish care with urologist Dr. Jacques Paniagua.    Discussed one time dispense for increased/ breakthrough pain

## 2024-06-13 ENCOUNTER — OFFICE VISIT (OUTPATIENT)
Dept: FAMILY MEDICINE | Facility: CLINIC | Age: 39
End: 2024-06-13
Payer: MEDICAID

## 2024-06-13 VITALS
DIASTOLIC BLOOD PRESSURE: 85 MMHG | WEIGHT: 208.38 LBS | RESPIRATION RATE: 20 BRPM | TEMPERATURE: 98 F | SYSTOLIC BLOOD PRESSURE: 128 MMHG | HEART RATE: 65 BPM | HEIGHT: 64 IN | BODY MASS INDEX: 35.58 KG/M2 | OXYGEN SATURATION: 98 %

## 2024-06-13 DIAGNOSIS — R10.9 ABDOMINAL PAIN, UNSPECIFIED ABDOMINAL LOCATION: Primary | ICD-10-CM

## 2024-06-13 DIAGNOSIS — K21.9 GASTROESOPHAGEAL REFLUX DISEASE WITHOUT ESOPHAGITIS: ICD-10-CM

## 2024-06-13 DIAGNOSIS — G47.00 INSOMNIA, UNSPECIFIED TYPE: ICD-10-CM

## 2024-06-13 PROCEDURE — 1160F RVW MEDS BY RX/DR IN RCRD: CPT | Mod: CPTII,,,

## 2024-06-13 PROCEDURE — 3074F SYST BP LT 130 MM HG: CPT | Mod: CPTII,,,

## 2024-06-13 PROCEDURE — 3008F BODY MASS INDEX DOCD: CPT | Mod: CPTII,,,

## 2024-06-13 PROCEDURE — 99214 OFFICE O/P EST MOD 30 MIN: CPT | Mod: S$PBB,,,

## 2024-06-13 PROCEDURE — 3079F DIAST BP 80-89 MM HG: CPT | Mod: CPTII,,,

## 2024-06-13 PROCEDURE — 99214 OFFICE O/P EST MOD 30 MIN: CPT | Mod: PBBFAC,PN

## 2024-06-13 PROCEDURE — 1159F MED LIST DOCD IN RCRD: CPT | Mod: CPTII,,,

## 2024-06-13 RX ORDER — SUCRALFATE 1 G/1
1 TABLET ORAL
Qty: 56 TABLET | Refills: 0 | Status: SHIPPED | OUTPATIENT
Start: 2024-06-13 | End: 2024-06-27

## 2024-06-13 RX ORDER — TEMAZEPAM 15 MG/1
15 CAPSULE ORAL NIGHTLY PRN
Qty: 30 CAPSULE | Refills: 2 | Status: SHIPPED | OUTPATIENT
Start: 2024-06-13 | End: 2024-09-11

## 2024-06-13 RX ORDER — OMEPRAZOLE 20 MG/1
20 CAPSULE, DELAYED RELEASE ORAL DAILY
Qty: 90 CAPSULE | Refills: 3 | Status: SHIPPED | OUTPATIENT
Start: 2024-06-13 | End: 2025-06-13

## 2024-06-13 NOTE — PROGRESS NOTES
Patient Name: Renee Anaya     : 1985    MRN: [id number]     Subjective:     Patient ID: Renee Anaya is a 38 y.o. female.    Chief Complaint:   Chief Complaint   Patient presents with    Follow-up     F/u appointment. Recent ED visit. States stomach is still not right. Vomiting and diarrhea.  was told has gastritis.         HPI: 2024:  Patient presents to clinic today for follow-up of initiation of insomnia medications and to follow up on blood pressure medication. Unable to get doxepin secondary to insurance.   has been utilizing her pain medicine to assist with sleep.   that recently went to the emergency department at [facility], was diagnosed with gastritis but was not given any medication for her symptoms.  Still suffering with abdominal pain, nausea and vomiting.  UPT in clinic was negative today.  Patient also states that she follows up with her gynecologist at the end of this month, we will keep clinic updated. Patient denies chest pain, palpitations, and shortness of breath.  Patient denies fever, night sweats, chills, nausea, vomiting, diarrhea, constipation, weight loss, and changes in appetite.      2024: today  patient is complaining of worsening insomnia and pelvic pain.  has follow up with gynecologist and urologist in the coming month with external urologist, surgery that was plained for 2023 was cancelled. States normally controlled with NSAIDs and gabapentin but does have occasional increase in chronic pain. No red flag symptoms. Also states trazodone is no longer effective for insomnia. Darinel is able to sleep without medication some nights but not every night. Patient denies chest pain, palpitations, and shortness of breath.  Patient denies fever, night sweats, chills, nausea, vomiting, diarrhea, constipation, weight loss, and changes in appetite.    2023:  Patient presents today for routine follow-up on labs, patient has  "since establish with her gynecologist.  This provider started patient on Provera daily until surgery, she was scheduled for procedure possibly happening on December 27th should provider find a uro gynecologist to assist.  Is scheduled for preop on December 21st.  She is also complaining of a feeling of fullness in her bilateral ears and chronic tinnitus, states that she is been hearing ringing in her ears for many years, states that her right ear is worse than her left.  Notices it at most when she is going to bed at night.  States that it is intermittent, occasionally sounds almost like a heartbeat.  States she checks blood pressure at times of the sensations and denies hypertensive readings.  Normotensive in clinic today.  Endorses going to concerts and night clubs in her youth and standing by loud speakers.      08/22/2023:  Patient has appointment today that she self scheduled over concerns of irregular menses.  States that her cycles have become more and more irregular as of lately, she was supposed to start her cycle at the beginning of this month and it was about 8 days late.  States that she has since started her cycle but feels "off", states that she feels low back pain in her sciatic pain has flared up again since her cycle started.  Patient has not heard from urologist, will place uro gyn referral today to make sure she has follow-up of abnormal MRI.  Patient aware.  She denies any urinary complaints, denies increased urination, painful urination, burning urination, hesitancy dribbling or leakage.  Patient does endorse palpitations none currently in clinic today but states that they have not completely resolved, she never establish care with cardiologist and will be contacting clinic to follow-up on this workup. Patient denies chest pain and shortness of breath.      03/27/2023:  Appointment today was to review CT abdomen and pelvis that was ordered to further assess echogenic structure in the posterior " "wall of the bladder, patient never completed this imaging.  Ensured that patient has 2. Scheduling department in order to have this imaging scheduled.  Discussed with patient's importance of having scheduled.  Additionally patient states that she has no back pain and has never completed physical therapy today.  Today she is complaining of an anterior wall chest pain, states that she went to [facility] who told her that she will need a cardiologist.  Denies HA, blurred vision, chest pain, SOB, palpitations or edema in clinic today. She endorses reducing tobacco use to help reduce cough, states she is coughing pretty frequently and it is increased at night.  Denies any fever, wheezing, sputum production, exposure to known illnesses.    01/24/2023: Patient reported to emergency department since last office visit for back pain, patient states that she "fell down due to weakness" that was caused by her back pain.  States that the emergency department on gave her muscle relaxers.  She is requesting further workup of his back pain.  She has not tried any formal physical therapy.  Patient denies any changes to bladder or bowel continence, saddle anesthesia.  Patient denies any known trauma or fall leading up to this instance.  Patient states she is still able to move around and has not been utilizing Flexeril.  Additionally since last office visit patient did complete pelvic ultrasound, this study showed solid echogenic structure in the posterior wall of the bladder that measures 1.7 x 1.8 x 2 cm exact nature of these abnormality cannot be ascertained by the US. Follow up imaging ordered and discussed with patient today.     01/05/2023:  Patient presents to clinic today to establish care, she was previously in [facility] primary care group.  Patient being managed for hypertension, insomnia, ADHD.  Patient states that her medications are effective dose currently.  Patient does not know when her last Pap " smear was she does have a son who is 5 years old.  Patient is unsure of when her last wellness labs were drawn but states that she does not think she is ever had any thing wrong with her blood work.    Additionally today's biggest complaint is that she has extremely painful menstrual cycles and will have residual pelvic pain after bowel movements.  Patient states that there is no blood in her stool or urine.  Patient states that it has been this way for about 3 months consistently, previously it was off and on for about 4 years.  Patient further states that she had ovarian cyst between her 1st and 2nd child but states on a recent ultrasound they were unable to see cyst.  Patient states that intercourse is painful as well.  Patient does endorse malodorous urine, states it has been this way for about 2 weeks.  Endorses previous Trichomonas infections.        ROS:       12 point review of systems conducted, negative except as stated in the history of present illness. See HPI for details.    History:     Past Medical History:   Diagnosis Date    Anemia     Anxiety     Depression     Hormone disorder     Hypertension     Menstrual symptom or sign     PID (pelvic inflammatory disease)     STD (sexually transmitted disease)         Past Surgical History:   Procedure Laterality Date     SECTION      TONSILLECTOMY         Family History   Problem Relation Name Age of Onset    Cancer Mother Lanny malone     Diabetes Mother Lanny malone     Diabetes Father Chaim Anaya     Cancer Maternal Grandfather Vj Basilio     Diabetes Sister Camila Basilio     Hypertension Maternal Aunt Raquel basilio         Social History     Tobacco Use    Smoking status: Every Day     Current packs/day: 0.50     Average packs/day: 0.5 packs/day for 15.0 years (7.5 ttl pk-yrs)     Types: Cigarettes    Smokeless tobacco: Never    Tobacco comments:     States smokes about 6 cigarettes a day   Substance and Sexual Activity    Alcohol use:  "Yes     Comment: "very seldom"    Drug use: Yes     Types: Marijuana    Sexual activity: Yes     Partners: Male       Current Outpatient Medications   Medication Instructions    albuterol (PROVENTIL HFA) 90 mcg/actuation inhaler 2 puffs, Inhalation, Every 6 hours PRN, Rescue    cetirizine (ZYRTEC) 10 mg, Oral, Nightly    doxepin (SILENOR) 6 mg, Oral, Nightly    gabapentin (NEURONTIN) 600 mg, Oral, 3 times daily PRN    NIFEdipine (PROCARDIA-XL) 60 mg, Oral, Daily    omeprazole (PRILOSEC) 20 mg, Oral, Daily    sucralfate (CARAFATE) 1 g, Oral, Before meals & nightly    temazepam (RESTORIL) 15 mg, Oral, Nightly PRN        Review of patient's allergies indicates:   Allergen Reactions    Penicillins Hives, Itching and Shortness Of Breath    Tramadol Itching    Ibuprofen Nausea Only    Milk containing products (dairy) Nausea Only       Objective:     Visit Vitals  /85 (BP Location: Right arm, Patient Position: Sitting)   Pulse 65   Temp 98.2 °F (36.8 °C) (Oral)   Resp 20   Ht 5' 4" (1.626 m)   Wt 94.5 kg (208 lb 6.4 oz)   LMP 05/05/2024 (Approximate)   SpO2 98%   BMI 35.77 kg/m²       Physical Examination:     Physical Exam  Constitutional:       General: She is not in acute distress.     Appearance: Normal appearance. She is not ill-appearing.   Cardiovascular:      Rate and Rhythm: Normal rate and regular rhythm.      Heart sounds: Normal heart sounds.   Pulmonary:      Effort: Pulmonary effort is normal. No respiratory distress.      Breath sounds: Normal breath sounds.   Abdominal:      General: Abdomen is flat. There is no distension.      Tenderness: There is no abdominal tenderness. There is no right CVA tenderness, left CVA tenderness or guarding.   Musculoskeletal:      Cervical back: Normal range of motion.   Skin:     General: Skin is warm and dry.   Neurological:      Mental Status: She is alert and oriented to person, place, and time.   Psychiatric:         Mood and Affect: Mood normal.         Behavior: " Behavior normal.         Lab Results:     Chemistry:  Lab Results   Component Value Date     01/05/2023    K 4.2 01/05/2023    BUN 10.5 01/05/2023    CREATININE 0.75 01/05/2023    EGFRNORACEVR >90 01/05/2023    GLUCOSE 80 01/05/2023    CALCIUM 8.8 01/05/2023    ALKPHOS 86 01/05/2023    LABPROT 7.2 01/05/2023    ALBUMIN 3.8 01/05/2023    AST 12 01/05/2023    ALT 10 01/05/2023    TSH 0.657 01/05/2023    VUZQVH2ZSKZ 0.87 01/05/2023        Lab Results   Component Value Date    HGBA1C 4.8 01/05/2023        Hematology:  Lab Results   Component Value Date    WBC 7.2 01/05/2023    HGB 12.8 01/05/2023    HCT 37.9 01/05/2023     01/05/2023       Lipid Panel:  Lab Results   Component Value Date    CHOL 113 01/05/2023    HDL 51 01/05/2023    LDL 54.00 01/05/2023    TRIG 42 01/05/2023    TOTALCHOLEST 2 01/05/2023        Urine:  Lab Results   Component Value Date    APPEARANCEUA Clear 08/22/2023    SGUA 1.031 08/22/2023    PROTEINUA 1+ (A) 08/22/2023    KETONESUA 1+ (A) 08/22/2023    LEUKOCYTESUR Negative 08/22/2023    RBCUA 0-5 08/22/2023    WBCUA 0-5 08/22/2023    BACTERIA None Seen 08/22/2023    SQEPUA Few (A) 08/22/2023    HYALINECASTS 0-2 (A) 08/22/2023        Assessment:          ICD-10-CM ICD-9-CM   1. Abdominal pain, unspecified abdominal location  R10.9 789.00   2. Gastroesophageal reflux disease without esophagitis  K21.9 530.81   3. Insomnia, unspecified type  G47.00 780.52        Plan:     1. Abdominal pain, unspecified abdominal location  -     POCT Urine Pregnancy  -     sucralfate (CARAFATE) 1 gram tablet; Take 1 tablet (1 g total) by mouth 4 (four) times daily before meals and nightly. for 14 days  Dispense: 56 tablet; Refill: 0    2. Gastroesophageal reflux disease without esophagitis  Assessment & Plan:  Start omeprazole 20 mg daily after complete Carafate  Avoid spicy, acidic, fried foods and alcohol.  Eat 2-3 hours before going to bed.  Avoid tight clothing, chew food thoroughly.  Reduce caffeine  intake, avoid soda.  Stressed importance of Smoking/Tobacco Cessation.  Increased risk of Osteoporosis with PPI use over 1 year. Increase Calcium intake 1200-1800mg. It also increases risk for stomach polyp, dementia, and malnutrition      Orders:  -     omeprazole (PRILOSEC) 20 MG capsule; Take 1 capsule (20 mg total) by mouth once daily.  Dispense: 90 capsule; Refill: 3    3. Insomnia, unspecified type  Overview:  Avoid caffeine, alcohol and stimulants. Do not use illicit drugs.  Practice positive phrases and repeat throughout the day.  Try yoga, lavender scents or Chamomile tea to promote relaxation.  Set healthy boundaries, avoid people and conversations that increase stress.  Avoid caffeinated beverages after lunch  Avoid alcohol near bedtime (eg, late afternoon and evening)  Avoid smoking or other nicotine intake, particularly during the evening  Exercise regularly for at least 20 minutes, preferably more than four to five hours prior to bedtime  Avoid daytime naps, especially if they are longer than 20 to 30 minutes or occur late in the day  Resolve concerns or worries before bedtime  Try not to force sleep    Sleep Hygiene Techniques: Sleep hygiene refers to actions that tend to improve and maintain good sleep  Power down electronic devices at least one hour prior to bedtime.  Keep room dark; use eye mask or relaxation sound machine to promote rest.  Sleep as long as necessary to feel rested (usually seven to eight hours for adults) and then get out of bed  Maintain a regular sleep schedule, particularly a regular wake-up time in the morning      Assessment & Plan:  Patient has failed trazodone, doxepin, melatonin, vistaril.    Trial Restoril 15 mg nightly     Orders:  -     temazepam (RESTORIL) 15 mg Cap; Take 1 capsule (15 mg total) by mouth nightly as needed (insomnia).  Dispense: 30 capsule; Refill: 2           Future Appointments   Date Time Provider Department Center   11/13/2024  9:30 AM  Mariana Perez NP [facility]        Mariana Perez NP

## 2024-06-13 NOTE — ASSESSMENT & PLAN NOTE
Patient has appointment with Cardiology tomorrow; faxed lab results to [facility] for their reference  Patient has failed trazodone, doxepin, melatonin, vistaril.    Trial Restoril 15 mg nightly

## 2024-06-13 NOTE — ASSESSMENT & PLAN NOTE
Start omeprazole 20 mg daily after complete Carafate  Avoid spicy, acidic, fried foods and alcohol.  Eat 2-3 hours before going to bed.  Avoid tight clothing, chew food thoroughly.  Reduce caffeine intake, avoid soda.  Stressed importance of Smoking/Tobacco Cessation.  Increased risk of Osteoporosis with PPI use over 1 year. Increase Calcium intake 1200-1800mg. It also increases risk for stomach polyp, dementia, and malnutrition

## 2024-09-05 ENCOUNTER — HOSPITAL ENCOUNTER (EMERGENCY)
Facility: HOSPITAL | Age: 39
Discharge: HOME | End: 2024-09-05
Attending: STUDENT IN AN ORGANIZED HEALTH CARE EDUCATION/TRAINING PROGRAM
Payer: COMMERCIAL

## 2024-09-05 VITALS
TEMPERATURE: 97 F | OXYGEN SATURATION: 97 % | SYSTOLIC BLOOD PRESSURE: 138 MMHG | HEART RATE: 98 BPM | WEIGHT: 207 LBS | HEIGHT: 64 IN | DIASTOLIC BLOOD PRESSURE: 89 MMHG | BODY MASS INDEX: 35.34 KG/M2 | RESPIRATION RATE: 21 BRPM
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
## 2024-09-05 DIAGNOSIS — S51.812A LACERATION OF LEFT FOREARM, INITIAL ENCOUNTER: ICD-10-CM

## 2024-09-05 DIAGNOSIS — T14.90XA TRAUMA: ICD-10-CM

## 2024-09-05 DIAGNOSIS — S02.2XXA CLOSED FRACTURE OF NASAL BONE, INITIAL ENCOUNTER: ICD-10-CM

## 2024-09-05 DIAGNOSIS — F10.929 ALCOHOLIC INTOXICATION WITH COMPLICATION: ICD-10-CM

## 2024-09-05 DIAGNOSIS — S01.81XA FACIAL LACERATION, INITIAL ENCOUNTER: Primary | ICD-10-CM

## 2024-09-05 LAB
ABORH RETYPE: NORMAL
ALBUMIN SERPL-MCNC: 3.6 G/DL (ref 3.5–5)
ALBUMIN/GLOB SERPL: 0.9 RATIO (ref 1.1–2)
ALP SERPL-CCNC: 90 UNIT/L (ref 40–150)
ALT SERPL-CCNC: 13 UNIT/L (ref 0–55)
AMPHET UR QL SCN: NEGATIVE
ANION GAP SERPL CALC-SCNC: 11 MEQ/L
APTT PPP: 22.4 SECONDS (ref 23.2–33.7)
AST SERPL-CCNC: 18 UNIT/L (ref 5–34)
BACTERIA #/AREA URNS AUTO: NORMAL /HPF
BARBITURATE SCN PRESENT UR: NEGATIVE
BASOPHILS # BLD AUTO: 0.02 X10(3)/MCL
BASOPHILS NFR BLD AUTO: 0.2 %
BENZODIAZ UR QL SCN: NEGATIVE
BILIRUB SERPL-MCNC: 0.2 MG/DL
BILIRUB UR QL STRIP.AUTO: NEGATIVE
BUN SERPL-MCNC: 11.9 MG/DL (ref 7–18.7)
CALCIUM SERPL-MCNC: 8.6 MG/DL (ref 8.4–10.2)
CANNABINOIDS UR QL SCN: POSITIVE
CHLORIDE SERPL-SCNC: 111 MMOL/L (ref 98–107)
CLARITY UR: CLEAR
CO2 SERPL-SCNC: 21 MMOL/L (ref 22–29)
COCAINE UR QL SCN: NEGATIVE
COLOR UR AUTO: COLORLESS
CREAT SERPL-MCNC: 0.87 MG/DL (ref 0.55–1.02)
CREAT/UREA NIT SERPL: 14
EOSINOPHIL # BLD AUTO: 0.09 X10(3)/MCL (ref 0–0.9)
EOSINOPHIL NFR BLD AUTO: 0.8 %
ERYTHROCYTE [DISTWIDTH] IN BLOOD BY AUTOMATED COUNT: 13.2 % (ref 11.5–17)
ETHANOL SERPL-MCNC: 184 MG/DL
FENTANYL UR QL SCN: POSITIVE
GFR SERPLBLD CREATININE-BSD FMLA CKD-EPI: >60 ML/MIN/1.73/M2
GLOBULIN SER-MCNC: 3.8 GM/DL (ref 2.4–3.5)
GLUCOSE SERPL-MCNC: 93 MG/DL (ref 74–100)
GLUCOSE UR QL STRIP: NORMAL
GROUP & RH: NORMAL
HCT VFR BLD AUTO: 37.9 % (ref 37–47)
HGB BLD-MCNC: 12.7 G/DL (ref 12–16)
HGB UR QL STRIP: NEGATIVE
IMM GRANULOCYTES # BLD AUTO: 0.03 X10(3)/MCL (ref 0–0.04)
IMM GRANULOCYTES NFR BLD AUTO: 0.3 %
INDIRECT COOMBS: NORMAL
INR PPP: 1
KETONES UR QL STRIP: NEGATIVE
LACTATE SERPL-SCNC: 1.9 MMOL/L (ref 0.5–2.2)
LEUKOCYTE ESTERASE UR QL STRIP: NEGATIVE
LYMPHOCYTES # BLD AUTO: 2.67 X10(3)/MCL (ref 0.6–4.6)
LYMPHOCYTES NFR BLD AUTO: 22.3 %
MCH RBC QN AUTO: 32.7 PG (ref 27–31)
MCHC RBC AUTO-ENTMCNC: 33.5 G/DL (ref 33–36)
MCV RBC AUTO: 97.7 FL (ref 80–94)
MDMA UR QL SCN: NEGATIVE
MONOCYTES # BLD AUTO: 0.53 X10(3)/MCL (ref 0.1–1.3)
MONOCYTES NFR BLD AUTO: 4.4 %
NEUTROPHILS # BLD AUTO: 8.61 X10(3)/MCL (ref 2.1–9.2)
NEUTROPHILS NFR BLD AUTO: 72 %
NITRITE UR QL STRIP: NEGATIVE
NRBC BLD AUTO-RTO: 0 %
OPIATES UR QL SCN: NEGATIVE
PCP UR QL: NEGATIVE
PH UR STRIP: 6.5 [PH]
PH UR: 6.5 [PH] (ref 3–11)
PLATELET # BLD AUTO: 271 X10(3)/MCL (ref 130–400)
PMV BLD AUTO: 9.8 FL (ref 7.4–10.4)
POTASSIUM SERPL-SCNC: 3.1 MMOL/L (ref 3.5–5.1)
PROT SERPL-MCNC: 7.4 GM/DL (ref 6.4–8.3)
PROT UR QL STRIP: NEGATIVE
PROTHROMBIN TIME: 12.5 SECONDS (ref 12.5–14.5)
RBC # BLD AUTO: 3.88 X10(6)/MCL (ref 4.2–5.4)
RBC #/AREA URNS AUTO: NORMAL /HPF
SODIUM SERPL-SCNC: 143 MMOL/L (ref 136–145)
SP GR UR STRIP.AUTO: 1.02 (ref 1–1.03)
SPECIFIC GRAVITY, URINE AUTO (.000) (OHS): 1.02 (ref 1–1.03)
SPECIMEN OUTDATE: NORMAL
SQUAMOUS #/AREA URNS LPF: NORMAL /HPF
UROBILINOGEN UR STRIP-ACNC: NORMAL
WBC # BLD AUTO: 11.95 X10(3)/MCL (ref 4.5–11.5)
WBC #/AREA URNS AUTO: NORMAL /HPF

## 2024-09-05 PROCEDURE — 25500020 PHARM REV CODE 255: Performed by: STUDENT IN AN ORGANIZED HEALTH CARE EDUCATION/TRAINING PROGRAM

## 2024-09-05 PROCEDURE — G0390 TRAUMA RESPONS W/HOSP CRITI: HCPCS

## 2024-09-05 PROCEDURE — 63600175 PHARM REV CODE 636 W HCPCS

## 2024-09-05 PROCEDURE — 80307 DRUG TEST PRSMV CHEM ANLYZR: CPT | Performed by: STUDENT IN AN ORGANIZED HEALTH CARE EDUCATION/TRAINING PROGRAM

## 2024-09-05 PROCEDURE — 90471 IMMUNIZATION ADMIN: CPT | Performed by: STUDENT IN AN ORGANIZED HEALTH CARE EDUCATION/TRAINING PROGRAM

## 2024-09-05 PROCEDURE — 25000003 PHARM REV CODE 250: Performed by: STUDENT IN AN ORGANIZED HEALTH CARE EDUCATION/TRAINING PROGRAM

## 2024-09-05 PROCEDURE — 83605 ASSAY OF LACTIC ACID: CPT | Performed by: STUDENT IN AN ORGANIZED HEALTH CARE EDUCATION/TRAINING PROGRAM

## 2024-09-05 PROCEDURE — 12014 RPR F/E/E/N/L/M 5.1-7.5 CM: CPT

## 2024-09-05 PROCEDURE — 85610 PROTHROMBIN TIME: CPT | Performed by: STUDENT IN AN ORGANIZED HEALTH CARE EDUCATION/TRAINING PROGRAM

## 2024-09-05 PROCEDURE — 86850 RBC ANTIBODY SCREEN: CPT | Performed by: STUDENT IN AN ORGANIZED HEALTH CARE EDUCATION/TRAINING PROGRAM

## 2024-09-05 PROCEDURE — 81001 URINALYSIS AUTO W/SCOPE: CPT | Mod: XB | Performed by: STUDENT IN AN ORGANIZED HEALTH CARE EDUCATION/TRAINING PROGRAM

## 2024-09-05 PROCEDURE — 96375 TX/PRO/DX INJ NEW DRUG ADDON: CPT

## 2024-09-05 PROCEDURE — 82077 ASSAY SPEC XCP UR&BREATH IA: CPT | Performed by: STUDENT IN AN ORGANIZED HEALTH CARE EDUCATION/TRAINING PROGRAM

## 2024-09-05 PROCEDURE — 63600175 PHARM REV CODE 636 W HCPCS: Performed by: STUDENT IN AN ORGANIZED HEALTH CARE EDUCATION/TRAINING PROGRAM

## 2024-09-05 PROCEDURE — 99291 CRITICAL CARE FIRST HOUR: CPT

## 2024-09-05 PROCEDURE — 99283 EMERGENCY DEPT VISIT LOW MDM: CPT | Mod: ,,, | Performed by: NURSE PRACTITIONER

## 2024-09-05 PROCEDURE — 12002 RPR S/N/AX/GEN/TRNK2.6-7.5CM: CPT

## 2024-09-05 PROCEDURE — 96374 THER/PROPH/DIAG INJ IV PUSH: CPT | Mod: 59

## 2024-09-05 PROCEDURE — 90715 TDAP VACCINE 7 YRS/> IM: CPT | Performed by: STUDENT IN AN ORGANIZED HEALTH CARE EDUCATION/TRAINING PROGRAM

## 2024-09-05 PROCEDURE — 80053 COMPREHEN METABOLIC PANEL: CPT | Performed by: STUDENT IN AN ORGANIZED HEALTH CARE EDUCATION/TRAINING PROGRAM

## 2024-09-05 PROCEDURE — 85730 THROMBOPLASTIN TIME PARTIAL: CPT | Performed by: STUDENT IN AN ORGANIZED HEALTH CARE EDUCATION/TRAINING PROGRAM

## 2024-09-05 PROCEDURE — 96376 TX/PRO/DX INJ SAME DRUG ADON: CPT

## 2024-09-05 PROCEDURE — 85025 COMPLETE CBC W/AUTO DIFF WBC: CPT | Performed by: STUDENT IN AN ORGANIZED HEALTH CARE EDUCATION/TRAINING PROGRAM

## 2024-09-05 RX ORDER — ONDANSETRON HYDROCHLORIDE 2 MG/ML
4 INJECTION, SOLUTION INTRAVENOUS
Status: COMPLETED | OUTPATIENT
Start: 2024-09-05 | End: 2024-09-05

## 2024-09-05 RX ORDER — MORPHINE SULFATE 4 MG/ML
4 INJECTION, SOLUTION INTRAMUSCULAR; INTRAVENOUS
Status: COMPLETED | OUTPATIENT
Start: 2024-09-05 | End: 2024-09-05

## 2024-09-05 RX ORDER — DOXYCYCLINE 100 MG/1
100 CAPSULE ORAL 2 TIMES DAILY
Qty: 14 CAPSULE | Refills: 0 | Status: SHIPPED | OUTPATIENT
Start: 2024-09-05 | End: 2024-09-12

## 2024-09-05 RX ORDER — LIDOCAINE HYDROCHLORIDE AND EPINEPHRINE 10; 10 UG/ML; MG/ML
1 INJECTION, SOLUTION INFILTRATION; PERINEURAL
Status: COMPLETED | OUTPATIENT
Start: 2024-09-05 | End: 2024-09-05

## 2024-09-05 RX ORDER — ONDANSETRON HYDROCHLORIDE 2 MG/ML
INJECTION, SOLUTION INTRAVENOUS
Status: COMPLETED
Start: 2024-09-05 | End: 2024-09-05

## 2024-09-05 RX ORDER — MORPHINE SULFATE 4 MG/ML
INJECTION, SOLUTION INTRAMUSCULAR; INTRAVENOUS
Status: COMPLETED
Start: 2024-09-05 | End: 2024-09-05

## 2024-09-05 RX ORDER — FENTANYL CITRATE 50 UG/ML
INJECTION, SOLUTION INTRAMUSCULAR; INTRAVENOUS CODE/TRAUMA/SEDATION MEDICATION
Status: COMPLETED | OUTPATIENT
Start: 2024-09-05 | End: 2024-09-05

## 2024-09-05 RX ORDER — SODIUM CHLORIDE 9 MG/ML
1000 INJECTION, SOLUTION INTRAVENOUS
Status: COMPLETED | OUTPATIENT
Start: 2024-09-05 | End: 2024-09-05

## 2024-09-05 RX ORDER — MUPIROCIN 20 MG/G
1 OINTMENT TOPICAL
Status: COMPLETED | OUTPATIENT
Start: 2024-09-05 | End: 2024-09-05

## 2024-09-05 RX ORDER — ONDANSETRON HYDROCHLORIDE 2 MG/ML
INJECTION, SOLUTION INTRAVENOUS
Status: DISCONTINUED
Start: 2024-09-05 | End: 2024-09-05 | Stop reason: HOSPADM

## 2024-09-05 RX ORDER — LIDOCAINE HYDROCHLORIDE 10 MG/ML
INJECTION, SOLUTION INFILTRATION; PERINEURAL
Status: DISCONTINUED
Start: 2024-09-05 | End: 2024-09-05 | Stop reason: HOSPADM

## 2024-09-05 RX ORDER — METHOCARBAMOL 500 MG/1
1000 TABLET, FILM COATED ORAL 3 TIMES DAILY
Qty: 30 TABLET | Refills: 0 | Status: SHIPPED | OUTPATIENT
Start: 2024-09-05 | End: 2024-09-10

## 2024-09-05 RX ORDER — SODIUM CHLORIDE 9 MG/ML
INJECTION, SOLUTION INTRAVENOUS
Status: COMPLETED | OUTPATIENT
Start: 2024-09-05 | End: 2024-09-05

## 2024-09-05 RX ORDER — ONDANSETRON HYDROCHLORIDE 2 MG/ML
INJECTION, SOLUTION INTRAVENOUS CODE/TRAUMA/SEDATION MEDICATION
Status: COMPLETED | OUTPATIENT
Start: 2024-09-05 | End: 2024-09-05

## 2024-09-05 RX ORDER — CEFAZOLIN SODIUM 1 G/3ML
INJECTION, POWDER, FOR SOLUTION INTRAMUSCULAR; INTRAVENOUS
Status: COMPLETED
Start: 2024-09-05 | End: 2024-09-05

## 2024-09-05 RX ORDER — LIDOCAINE HYDROCHLORIDE 10 MG/ML
10 INJECTION, SOLUTION INFILTRATION; PERINEURAL
Status: COMPLETED | OUTPATIENT
Start: 2024-09-05 | End: 2024-09-05

## 2024-09-05 RX ORDER — FENTANYL CITRATE 50 UG/ML
INJECTION, SOLUTION INTRAMUSCULAR; INTRAVENOUS
Status: DISCONTINUED
Start: 2024-09-05 | End: 2024-09-05 | Stop reason: HOSPADM

## 2024-09-05 RX ADMIN — IOHEXOL 100 ML: 350 INJECTION, SOLUTION INTRAVENOUS at 01:09

## 2024-09-05 RX ADMIN — LIDOCAINE HYDROCHLORIDE,EPINEPHRINE BITARTRATE 1 ML: 10; .01 INJECTION, SOLUTION INFILTRATION; PERINEURAL at 06:09

## 2024-09-05 RX ADMIN — SODIUM CHLORIDE 999 ML/HR: 9 INJECTION, SOLUTION INTRAVENOUS at 01:09

## 2024-09-05 RX ADMIN — MORPHINE SULFATE 4 MG: 4 INJECTION, SOLUTION INTRAMUSCULAR; INTRAVENOUS at 04:09

## 2024-09-05 RX ADMIN — SODIUM CHLORIDE 1000 ML: 9 INJECTION, SOLUTION INTRAVENOUS at 09:09

## 2024-09-05 RX ADMIN — TETANUS TOXOID, REDUCED DIPHTHERIA TOXOID AND ACELLULAR PERTUSSIS VACCINE, ADSORBED 0.5 ML: 5; 2.5; 8; 8; 2.5 SUSPENSION INTRAMUSCULAR at 01:09

## 2024-09-05 RX ADMIN — MUPIROCIN 1 TUBE: 20 OINTMENT TOPICAL at 06:09

## 2024-09-05 RX ADMIN — LIDOCAINE HYDROCHLORIDE 10 ML: 10 INJECTION, SOLUTION INFILTRATION; PERINEURAL at 02:09

## 2024-09-05 RX ADMIN — ONDANSETRON HYDROCHLORIDE 4 MG: 2 INJECTION, SOLUTION INTRAVENOUS at 06:09

## 2024-09-05 RX ADMIN — MORPHINE SULFATE 4 MG: 4 INJECTION, SOLUTION INTRAMUSCULAR; INTRAVENOUS at 06:09

## 2024-09-05 RX ADMIN — ONDANSETRON 4 MG: 2 INJECTION INTRAMUSCULAR; INTRAVENOUS at 08:09

## 2024-09-05 RX ADMIN — ONDANSETRON 4 MG: 2 INJECTION INTRAMUSCULAR; INTRAVENOUS at 06:09

## 2024-09-05 RX ADMIN — CEFAZOLIN 2 G: 330 INJECTION, POWDER, FOR SOLUTION INTRAMUSCULAR; INTRAVENOUS at 01:09

## 2024-09-05 RX ADMIN — ONDANSETRON 4 MG: 2 INJECTION INTRAMUSCULAR; INTRAVENOUS at 01:09

## 2024-09-05 RX ADMIN — ONDANSETRON 4 MG: 2 INJECTION INTRAMUSCULAR; INTRAVENOUS at 04:09

## 2024-09-05 RX ADMIN — FENTANYL CITRATE 100 MCG: 50 INJECTION, SOLUTION INTRAMUSCULAR; INTRAVENOUS at 01:09

## 2024-09-05 NOTE — DISCHARGE INSTRUCTIONS
You may take muscle relaxer and anti-inflammatory as prescribed.      Follow-up with the primary care physician.  You have some thickening of the gallbladder wall.  This will need follow-up with your primary care physician or Urology.    Your stitches in the face will dissolve on their own.  Stitches in your left arm will need to be removed in 14 days.    Take antibiotic as prescribed.    Return to the emergency department if any new or worsening symptoms, chest pain shortness of breath, nausea, vomiting, difficulty breathing, fever, or any other concerns.
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
## 2024-09-05 NOTE — SUBJECTIVE & OBJECTIVE
"No current facility-administered medications on file prior to encounter.     Current Outpatient Medications on File Prior to Encounter   Medication Sig    albuterol (PROVENTIL HFA) 90 mcg/actuation inhaler Inhale 2 puffs into the lungs every 6 (six) hours as needed for Wheezing. Rescue    cetirizine (ZYRTEC) 10 MG tablet Take 1 tablet (10 mg total) by mouth every evening.    gabapentin (NEURONTIN) 300 MG capsule Take 2 capsules (600 mg total) by mouth 3 (three) times daily as needed (pain).    NIFEdipine (PROCARDIA-XL) 60 MG (OSM) 24 hr tablet Take 1 tablet (60 mg total) by mouth once daily.    omeprazole (PRILOSEC) 20 MG capsule Take 1 capsule (20 mg total) by mouth once daily.    temazepam (RESTORIL) 15 mg Cap Take 1 capsule (15 mg total) by mouth nightly as needed (insomnia).       Review of patient's allergies indicates:   Allergen Reactions    Penicillins Hives, Itching and Shortness Of Breath    Tramadol Itching    Ibuprofen Nausea Only    Milk containing products (dairy) Nausea Only       Past Medical History:   Diagnosis Date    Anemia     Anxiety     Depression     Hormone disorder     Hypertension     Menstrual symptom or sign     PID (pelvic inflammatory disease)     STD (sexually transmitted disease)      Past Surgical History:   Procedure Laterality Date     SECTION      TONSILLECTOMY       Family History       Problem Relation (Age of Onset)    Cancer Mother, Maternal Grandfather    Diabetes Mother, Father, Sister    Hypertension Maternal Aunt          Tobacco Use    Smoking status: Every Day     Current packs/day: 0.50     Average packs/day: 0.5 packs/day for 15.0 years (7.5 ttl pk-yrs)     Types: Cigarettes    Smokeless tobacco: Never    Tobacco comments:     States smokes about 6 cigarettes a day   Substance and Sexual Activity    Alcohol use: Yes     Comment: "very seldom"    Drug use: Yes     Types: Marijuana    Sexual activity: Yes     Partners: Male     Review of Systems "   Constitutional:  Negative for chills and fever.   HENT:  Negative for ear pain and trouble swallowing.         Facial pain.    Eyes:  Negative for pain and redness.   Respiratory:  Negative for cough and chest tightness.    Cardiovascular:  Negative for chest pain, palpitations and leg swelling.   Gastrointestinal:  Negative for abdominal distention, abdominal pain, nausea and vomiting.   Genitourinary:  Negative for difficulty urinating.   Musculoskeletal:  Negative for back pain and neck pain.   Skin:  Positive for wound. Negative for color change and pallor.   Neurological:  Negative for dizziness, syncope, speech difficulty, weakness, light-headedness, numbness and headaches.   Psychiatric/Behavioral:  Negative for agitation and suicidal ideas.    All other systems reviewed and are negative.    Objective:     Vital Signs (Most Recent):  Temp: 96.9 °F (36.1 °C) (09/05/24 0137)  Pulse: 83 (09/05/24 0137)  Resp: 17 (09/05/24 0137)  BP: 126/81 (09/05/24 0137)  SpO2: 97 % (09/05/24 0137) Vital Signs (24h Range):  Temp:  [96.9 °F (36.1 °C)-97.1 °F (36.2 °C)] 96.9 °F (36.1 °C)  Pulse:  [83-87] 83  Resp:  [16-18] 17  SpO2:  [94 %-98 %] 97 %  BP: (126-149)/() 126/81     Weight: 93.9 kg (207 lb)  Body mass index is 35.53 kg/m².     Physical Exam  Constitutional:       Appearance: Normal appearance.   HENT:      Head: Normocephalic and atraumatic.      Comments: As above     Nose: Nose normal.   Eyes:      Pupils: Pupils are equal, round, and reactive to light.   Cardiovascular:      Rate and Rhythm: Normal rate.      Pulses: Normal pulses.      Comments: Normal peripheral pulses  Pulmonary:      Effort: Pulmonary effort is normal. No respiratory distress.   Chest:      Chest wall: No tenderness.   Abdominal:      General: Abdomen is flat. Bowel sounds are normal. There is no distension.      Palpations: Abdomen is soft.      Tenderness: There is no abdominal tenderness.   Musculoskeletal:         General: No  swelling, tenderness, deformity or signs of injury.      Cervical back: Normal range of motion and neck supple. No tenderness.   Skin:     General: Skin is warm and dry.      Capillary Refill: Capillary refill takes less than 2 seconds.      Findings: No lesion.      Comments: Lacerations as above.    Neurological:      General: No focal deficit present.      Mental Status: She is alert and oriented to person, place, and time. Mental status is at baseline.   Psychiatric:         Mood and Affect: Mood normal.         Behavior: Behavior normal.         Thought Content: Thought content normal.         Judgment: Judgment normal.            I have reviewed all pertinent lab results within the past 24 hours.    Significant Diagnostics:  I have reviewed all pertinent imaging results/findings within the past 24 hours.

## 2024-09-17 ENCOUNTER — OFFICE VISIT (OUTPATIENT)
Facility: CLINIC | Age: 39
End: 2024-09-17
Payer: MEDICAID

## 2024-09-17 VITALS
HEIGHT: 64 IN | SYSTOLIC BLOOD PRESSURE: 137 MMHG | WEIGHT: 207 LBS | BODY MASS INDEX: 35.34 KG/M2 | DIASTOLIC BLOOD PRESSURE: 82 MMHG | HEART RATE: 78 BPM

## 2024-09-17 DIAGNOSIS — S41.112D LACERATION OF LEFT UPPER EXTREMITY, SUBSEQUENT ENCOUNTER: Primary | ICD-10-CM

## 2024-09-17 NOTE — LETTER
September 18, 2024      [facility]  [address] [place] [address] [address] [address]  Phone: [phone]  Fax: [phone]       Patient: Renee Anaya   YOB: 1985  Date of Visit: 09/18/2024    To Whom It May Concern:    Gracia Anaya  was at [facility] on 09/18/2024. The patient may return to work/school on 09/19/2024 with no restrictions. If you have any questions or concerns, or if I can be of further assistance, please do not hesitate to contact me.    Sincerely,    Ada Joel MA
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
## 2024-11-21 DIAGNOSIS — N80.9 ENDOMETRIOSIS: Primary | ICD-10-CM

## 2025-03-28 ENCOUNTER — TELEPHONE (OUTPATIENT)
Dept: FAMILY MEDICINE | Facility: CLINIC | Age: 40
End: 2025-03-28
Payer: MEDICAID

## 2025-03-28 NOTE — TELEPHONE ENCOUNTER
----- Message from Katerine Mirza sent at 3/27/2025  2:12 PM CDT -----  Who Called: Renee Kline the patient already have the specialty appointment scheduled?:noIf yes, what is the date of that appointment?:N/AReferral to What Specialty:CardiologyReason for Referral:N/ADoes the patient want the referral with a specific physician?:noIf yes, which provider?: [facility] the specialist [facility]?:Non-[facility]Preferred Method of Contact: Phone CallPatient's Preferred Phone Number on File: [phone] Best Call Back Number, if different:Additional Information: pt stated she went to the ER, she was told to have her pcp refer her to a heart specialist. Pt asked if it could be a provider located in the [place] area

## 2025-03-28 NOTE — TELEPHONE ENCOUNTER
Called and spoke with patient. Verified . States was seen in ED at [facility]. Called and spoke with [facility] Medical Records. Clinic fax number given. Awaiting most recent ED visit records.

## 2025-03-28 NOTE — TELEPHONE ENCOUNTER
Received records from [facility] via fax. Records scanned in patient's chart for review.

## 2025-03-28 NOTE — TELEPHONE ENCOUNTER
I don't see a recent ED visit to follow up with for review. Can inquire if she was seen externally? Will need to request records and then we can refer from there.     Thanks,    LAWRENCE Holland

## 2025-03-31 ENCOUNTER — PATIENT MESSAGE (OUTPATIENT)
Dept: FAMILY MEDICINE | Facility: CLINIC | Age: 40
End: 2025-03-31
Payer: MEDICAID

## 2025-05-14 ENCOUNTER — TELEPHONE (OUTPATIENT)
Dept: GYNECOLOGY | Facility: CLINIC | Age: 40
End: 2025-05-14
Payer: MEDICAID

## 2025-05-14 NOTE — TELEPHONE ENCOUNTER
Good morning!     I was able to move up the patients appointment with us to July. That is the soonest I could schedule/overbook her. Please call the patient and make her aware of the new date/time of the appt.     I will fax the referring providers office her new appointment information.       ----- Message from Oriana sent at 5/14/2025  9:16 AM CDT -----  Regarding: Pt Wants Sooner Appt  Referring provider's office called about this pt and wants to know if she can be seen sooner. The pt is having some issues in regards to unusual vaginal odor and blood in UA. The office said they are going to fax over her clinical notes from her last visit. Please advise.Thank you!

## 2025-07-01 ENCOUNTER — OFFICE VISIT (OUTPATIENT)
Dept: INTERNAL MEDICINE | Facility: CLINIC | Age: 40
End: 2025-07-01
Payer: MEDICAID

## 2025-07-01 ENCOUNTER — TELEPHONE (OUTPATIENT)
Dept: INTERNAL MEDICINE | Facility: CLINIC | Age: 40
End: 2025-07-01
Payer: MEDICAID

## 2025-07-01 ENCOUNTER — LAB VISIT (OUTPATIENT)
Dept: LAB | Facility: HOSPITAL | Age: 40
End: 2025-07-01
Attending: FAMILY MEDICINE
Payer: MEDICAID

## 2025-07-01 VITALS
HEIGHT: 64 IN | RESPIRATION RATE: 18 BRPM | BODY MASS INDEX: 37.3 KG/M2 | SYSTOLIC BLOOD PRESSURE: 139 MMHG | DIASTOLIC BLOOD PRESSURE: 83 MMHG | WEIGHT: 218.5 LBS | OXYGEN SATURATION: 98 % | TEMPERATURE: 98 F | HEART RATE: 58 BPM

## 2025-07-01 DIAGNOSIS — I10 HYPERTENSION, UNSPECIFIED TYPE: Primary | Chronic | ICD-10-CM

## 2025-07-01 DIAGNOSIS — Z11.3 SCREEN FOR STD (SEXUALLY TRANSMITTED DISEASE): ICD-10-CM

## 2025-07-01 DIAGNOSIS — K21.9 GASTROESOPHAGEAL REFLUX DISEASE WITHOUT ESOPHAGITIS: Chronic | ICD-10-CM

## 2025-07-01 DIAGNOSIS — I10 HYPERTENSION, UNSPECIFIED TYPE: ICD-10-CM

## 2025-07-01 DIAGNOSIS — M54.40 ACUTE MIDLINE LOW BACK PAIN WITH SCIATICA, SCIATICA LATERALITY UNSPECIFIED: ICD-10-CM

## 2025-07-01 DIAGNOSIS — J45.909 UNCOMPLICATED ASTHMA, UNSPECIFIED ASTHMA SEVERITY, UNSPECIFIED WHETHER PERSISTENT: Chronic | ICD-10-CM

## 2025-07-01 LAB
25(OH)D3+25(OH)D2 SERPL-MCNC: 16 NG/ML (ref 30–80)
ALBUMIN SERPL-MCNC: 3.6 G/DL (ref 3.5–5)
ALBUMIN/GLOB SERPL: 0.9 RATIO (ref 1.1–2)
ALP SERPL-CCNC: 99 UNIT/L (ref 40–150)
ALT SERPL-CCNC: 12 UNIT/L (ref 0–55)
ANION GAP SERPL CALC-SCNC: 5 MEQ/L
AST SERPL-CCNC: 11 UNIT/L (ref 11–45)
BASOPHILS # BLD AUTO: 0.02 X10(3)/MCL
BASOPHILS NFR BLD AUTO: 0.2 %
BILIRUB SERPL-MCNC: 0.8 MG/DL
BUN SERPL-MCNC: 9.8 MG/DL (ref 7–18.7)
CALCIUM SERPL-MCNC: 8.5 MG/DL (ref 8.4–10.2)
CHLORIDE SERPL-SCNC: 107 MMOL/L (ref 98–107)
CHOLEST SERPL-MCNC: 111 MG/DL
CHOLEST/HDLC SERPL: 3 {RATIO} (ref 0–5)
CO2 SERPL-SCNC: 27 MMOL/L (ref 22–29)
CREAT SERPL-MCNC: 0.77 MG/DL (ref 0.55–1.02)
CREAT/UREA NIT SERPL: 13
EOSINOPHIL # BLD AUTO: 0.11 X10(3)/MCL (ref 0–0.9)
EOSINOPHIL NFR BLD AUTO: 1.1 %
ERYTHROCYTE [DISTWIDTH] IN BLOOD BY AUTOMATED COUNT: 13.3 % (ref 11.5–17)
EST. AVERAGE GLUCOSE BLD GHB EST-MCNC: 99.7 MG/DL
GFR SERPLBLD CREATININE-BSD FMLA CKD-EPI: >60 ML/MIN/1.73/M2
GLOBULIN SER-MCNC: 4.1 GM/DL (ref 2.4–3.5)
GLUCOSE SERPL-MCNC: 87 MG/DL (ref 74–100)
HAV IGM SERPL QL IA: NONREACTIVE
HBA1C MFR BLD: 5.1 %
HBV CORE IGM SERPL QL IA: NONREACTIVE
HBV SURFACE AG SERPL QL IA: NONREACTIVE
HCT VFR BLD AUTO: 39.4 % (ref 37–47)
HCV AB SERPL QL IA: NONREACTIVE
HDLC SERPL-MCNC: 43 MG/DL (ref 35–60)
HGB BLD-MCNC: 13.2 G/DL (ref 12–16)
HIV 1+2 AB+HIV1 P24 AG SERPL QL IA: NONREACTIVE
IMM GRANULOCYTES # BLD AUTO: 0.03 X10(3)/MCL (ref 0–0.04)
IMM GRANULOCYTES NFR BLD AUTO: 0.3 %
LDLC SERPL CALC-MCNC: 53 MG/DL (ref 50–140)
LYMPHOCYTES # BLD AUTO: 2.09 X10(3)/MCL (ref 0.6–4.6)
LYMPHOCYTES NFR BLD AUTO: 21 %
MCH RBC QN AUTO: 32.8 PG (ref 27–31)
MCHC RBC AUTO-ENTMCNC: 33.5 G/DL (ref 33–36)
MCV RBC AUTO: 97.8 FL (ref 80–94)
MONOCYTES # BLD AUTO: 0.59 X10(3)/MCL (ref 0.1–1.3)
MONOCYTES NFR BLD AUTO: 5.9 %
NEUTROPHILS # BLD AUTO: 7.12 X10(3)/MCL (ref 2.1–9.2)
NEUTROPHILS NFR BLD AUTO: 71.5 %
NRBC BLD AUTO-RTO: 0 %
PLATELET # BLD AUTO: 318 X10(3)/MCL (ref 130–400)
PMV BLD AUTO: 10 FL (ref 7.4–10.4)
POTASSIUM SERPL-SCNC: 3.6 MMOL/L (ref 3.5–5.1)
PROT SERPL-MCNC: 7.7 GM/DL (ref 6.4–8.3)
RBC # BLD AUTO: 4.03 X10(6)/MCL (ref 4.2–5.4)
SODIUM SERPL-SCNC: 139 MMOL/L (ref 136–145)
T PALLIDUM AB SER QL: NONREACTIVE
TRIGL SERPL-MCNC: 76 MG/DL (ref 37–140)
TSH SERPL-ACNC: 0.71 UIU/ML (ref 0.35–4.94)
VLDLC SERPL CALC-MCNC: 15 MG/DL
WBC # BLD AUTO: 9.96 X10(3)/MCL (ref 4.5–11.5)

## 2025-07-01 PROCEDURE — 1159F MED LIST DOCD IN RCRD: CPT | Mod: CPTII,,, | Performed by: FAMILY MEDICINE

## 2025-07-01 PROCEDURE — 3008F BODY MASS INDEX DOCD: CPT | Mod: CPTII,,, | Performed by: FAMILY MEDICINE

## 2025-07-01 PROCEDURE — 1160F RVW MEDS BY RX/DR IN RCRD: CPT | Mod: CPTII,,, | Performed by: FAMILY MEDICINE

## 2025-07-01 PROCEDURE — 99214 OFFICE O/P EST MOD 30 MIN: CPT | Mod: PBBFAC | Performed by: FAMILY MEDICINE

## 2025-07-01 PROCEDURE — 3044F HG A1C LEVEL LT 7.0%: CPT | Mod: CPTII,,, | Performed by: FAMILY MEDICINE

## 2025-07-01 PROCEDURE — 86780 TREPONEMA PALLIDUM: CPT

## 2025-07-01 PROCEDURE — 3079F DIAST BP 80-89 MM HG: CPT | Mod: CPTII,,, | Performed by: FAMILY MEDICINE

## 2025-07-01 PROCEDURE — 87389 HIV-1 AG W/HIV-1&-2 AB AG IA: CPT

## 2025-07-01 PROCEDURE — 3075F SYST BP GE 130 - 139MM HG: CPT | Mod: CPTII,,, | Performed by: FAMILY MEDICINE

## 2025-07-01 PROCEDURE — 85025 COMPLETE CBC W/AUTO DIFF WBC: CPT

## 2025-07-01 PROCEDURE — 83036 HEMOGLOBIN GLYCOSYLATED A1C: CPT

## 2025-07-01 PROCEDURE — 80074 ACUTE HEPATITIS PANEL: CPT

## 2025-07-01 PROCEDURE — 80053 COMPREHEN METABOLIC PANEL: CPT

## 2025-07-01 PROCEDURE — 82306 VITAMIN D 25 HYDROXY: CPT

## 2025-07-01 PROCEDURE — 80061 LIPID PANEL: CPT

## 2025-07-01 PROCEDURE — 36415 COLL VENOUS BLD VENIPUNCTURE: CPT

## 2025-07-01 PROCEDURE — 99214 OFFICE O/P EST MOD 30 MIN: CPT | Mod: S$PBB,,, | Performed by: FAMILY MEDICINE

## 2025-07-01 PROCEDURE — 84443 ASSAY THYROID STIM HORMONE: CPT

## 2025-07-01 RX ORDER — ALBUTEROL SULFATE 90 UG/1
2 INHALANT RESPIRATORY (INHALATION) EVERY 6 HOURS PRN
Qty: 18 G | Refills: 1 | Status: SHIPPED | OUTPATIENT
Start: 2025-07-01

## 2025-07-01 RX ORDER — ATORVASTATIN CALCIUM 40 MG/1
40 TABLET, FILM COATED ORAL DAILY
COMMUNITY
Start: 2025-05-20 | End: 2025-07-01 | Stop reason: SDUPTHER

## 2025-07-01 RX ORDER — OMEPRAZOLE 20 MG/1
20 CAPSULE, DELAYED RELEASE ORAL DAILY
Qty: 90 CAPSULE | Refills: 3 | Status: SHIPPED | OUTPATIENT
Start: 2025-07-01 | End: 2026-07-01

## 2025-07-01 RX ORDER — NIFEDIPINE 60 MG/1
60 TABLET, EXTENDED RELEASE ORAL DAILY
Qty: 90 TABLET | Refills: 2 | Status: SHIPPED | OUTPATIENT
Start: 2025-07-01 | End: 2025-09-29

## 2025-07-01 RX ORDER — ATORVASTATIN CALCIUM 40 MG/1
40 TABLET, FILM COATED ORAL NIGHTLY
Qty: 90 TABLET | Refills: 2 | Status: SHIPPED | OUTPATIENT
Start: 2025-07-01

## 2025-07-01 RX ORDER — CETIRIZINE HYDROCHLORIDE 10 MG/1
10 TABLET ORAL NIGHTLY
Qty: 90 TABLET | Refills: 3 | Status: SHIPPED | OUTPATIENT
Start: 2025-07-01 | End: 2026-07-01
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
## 2025-07-01 NOTE — TELEPHONE ENCOUNTER
Copied from CRM #[id number]. Topic: Appointments - Appointment Access  >> Jul 1, 2025  8:58 AM Sydnie wrote:  .Who Called: Renee Anaya    Caller is requesting assistance/information from provider's office.    Symptoms (please be specific): pt called because she was stuck in traffic. States that she is in route passing through [place] and the gps states that she will make it at 9:31 called the back line no answer   How long has patient had these symptoms:  n/a   List of preferred pharmacies on file (remove unneeded): [unfilled]  If different, enter pharmacy into here including location and phone number: n/a       Preferred Method of Contact: Phone Call  Patient's Preferred Phone Number on File: [phone]   Best Call Back Number, if different:  Additional Information:

## 2025-07-04 PROBLEM — G47.00 INSOMNIA: Status: RESOLVED | Noted: 2023-01-05 | Resolved: 2025-07-04

## 2025-07-16 ENCOUNTER — OFFICE VISIT (OUTPATIENT)
Dept: INTERNAL MEDICINE | Facility: CLINIC | Age: 40
End: 2025-07-16
Payer: MEDICAID

## 2025-07-16 VITALS
HEART RATE: 65 BPM | WEIGHT: 219.5 LBS | BODY MASS INDEX: 37.47 KG/M2 | TEMPERATURE: 98 F | SYSTOLIC BLOOD PRESSURE: 135 MMHG | DIASTOLIC BLOOD PRESSURE: 84 MMHG | OXYGEN SATURATION: 97 % | RESPIRATION RATE: 20 BRPM | HEIGHT: 64 IN

## 2025-07-16 DIAGNOSIS — E55.9 VITAMIN D3 DEFICIENCY: ICD-10-CM

## 2025-07-16 DIAGNOSIS — M89.8X1 SHOULDER BLADE PAIN: ICD-10-CM

## 2025-07-16 DIAGNOSIS — I10 PRIMARY HYPERTENSION: Chronic | ICD-10-CM

## 2025-07-16 DIAGNOSIS — B35.3 ATHLETE'S FOOT ON LEFT: Primary | ICD-10-CM

## 2025-07-16 PROCEDURE — 99215 OFFICE O/P EST HI 40 MIN: CPT | Mod: PBBFAC | Performed by: FAMILY MEDICINE

## 2025-07-16 PROCEDURE — 3008F BODY MASS INDEX DOCD: CPT | Mod: CPTII,,, | Performed by: FAMILY MEDICINE

## 2025-07-16 PROCEDURE — 3044F HG A1C LEVEL LT 7.0%: CPT | Mod: CPTII,,, | Performed by: FAMILY MEDICINE

## 2025-07-16 PROCEDURE — 99214 OFFICE O/P EST MOD 30 MIN: CPT | Mod: S$PBB,,, | Performed by: FAMILY MEDICINE

## 2025-07-16 PROCEDURE — 3075F SYST BP GE 130 - 139MM HG: CPT | Mod: CPTII,,, | Performed by: FAMILY MEDICINE

## 2025-07-16 PROCEDURE — 3079F DIAST BP 80-89 MM HG: CPT | Mod: CPTII,,, | Performed by: FAMILY MEDICINE

## 2025-07-16 PROCEDURE — 1159F MED LIST DOCD IN RCRD: CPT | Mod: CPTII,,, | Performed by: FAMILY MEDICINE

## 2025-07-16 PROCEDURE — 1160F RVW MEDS BY RX/DR IN RCRD: CPT | Mod: CPTII,,, | Performed by: FAMILY MEDICINE

## 2025-07-16 RX ORDER — FLUCONAZOLE 150 MG/1
150 TABLET ORAL WEEKLY
Qty: 12 TABLET | Refills: 2 | Status: SHIPPED | OUTPATIENT
Start: 2025-07-16 | End: 2025-10-02

## 2025-07-16 RX ORDER — FUROSEMIDE 40 MG/1
40 TABLET ORAL DAILY
COMMUNITY
Start: 2025-07-01

## 2025-07-16 RX ORDER — ERGOCALCIFEROL 1.25 MG/1
50000 CAPSULE ORAL
Qty: 12 CAPSULE | Refills: 5 | Status: SHIPPED | OUTPATIENT
Start: 2025-07-16

## 2025-07-16 RX ORDER — CLOTRIMAZOLE 1 %
CREAM (GRAM) TOPICAL 2 TIMES DAILY
Qty: 60 G | Refills: 2 | Status: SHIPPED | OUTPATIENT
Start: 2025-07-16

## 2025-07-16 NOTE — PROGRESS NOTES
- last cycle...after period ended, continued to spot for few days. Will talk to OBGYN later this month.    - scapula discomfort - tense - no relief with robaxin -    - dr michael arteaga ([facility]) -- get records     MONAE PEPPER, DOMINICK   [facility]  [address] [address]      PATIENT NAME: Renee Anaya  : 1985  DATE: 25  MRN: [id number]      Reason for Visit / Chief Complaint: Follow-up and Results (Pain to back)       History of Present Illness / Problem Focused Workflow     Renee Anaya presents to the clinic with Follow-up and Results (Pain to back)     Ms. Renee Anaya is a 41 y/o AA female who presents to clinic for F/U visit - lab review. Has HTN, HDL, and asthma. All labs reviewed and discussed. Liver and kidneys nl, electrolytes nl, Vit D3 16, A1C within range at 5.1. Today, she c/o anti-fungal infection to right foot in-between 4th and 5th MT X several wks. Reports it is more bothersome during night after long day of work; moderate pruritus. Has been applying OTC anti-fungal powders and creams with no relief in symptoms.     After last period, continued to spot for few days. Advised to discuss with OBGYN later this month at scheduled appt.     C/o left scapula discomfort X 1 wk; denies trauma or injury. No relief with Robaxin or stretching. [facility] to [facility] [place] for further assmt/eval/tx. Will send external referral for PT.        Review of Systems     Review of Systems   All other systems reviewed and are negative.        Medications and Allergies     Medications  Medication List with Changes/Refills   New Medications    CLOTRIMAZOLE (LOTRIMIN) 1 % CREAM    Apply topically 2 (two) times daily.    ERGOCALCIFEROL (VITAMIN D2) 50,000 UNIT CAP    Take 1 capsule (50,000 Units total) by mouth every 7 days.    FLUCONAZOLE (DIFLUCAN) 150 MG TAB    Take 1 tablet (150 mg total) by mouth once a  week. for 12 doses   Current Medications    ALBUTEROL (PROVENTIL HFA) 90 MCG/ACTUATION INHALER    Inhale 2 puffs into the lungs every 6 (six) hours as needed for Wheezing. Rescue    ATORVASTATIN (LIPITOR) 40 MG TABLET    Take 1 tablet (40 mg total) by mouth every evening.    CETIRIZINE (ZYRTEC) 10 MG TABLET    Take 1 tablet (10 mg total) by mouth every evening.    FUROSEMIDE (LASIX) 40 MG TABLET    Take 40 mg by mouth once daily.    GABAPENTIN (NEURONTIN) 300 MG CAPSULE    Take 2 capsules (600 mg total) by mouth 3 (three) times daily as needed (pain).    NIFEDIPINE (PROCARDIA-XL) 60 MG (OSM) 24 HR TABLET    Take 1 tablet (60 mg total) by mouth once daily.    OMEPRAZOLE (PRILOSEC) 20 MG CAPSULE    Take 1 capsule (20 mg total) by mouth once daily.         Allergies  Review of patient's allergies indicates:   Allergen Reactions    Penicillins Hives, Itching and Shortness Of Breath    Tramadol Itching    Ibuprofen Nausea Only    Milk containing products (dairy) Nausea Only       Physical Examination     Vitals:    07/16/25 0842   BP: 135/84   Pulse: 65   Resp: 20   Temp: 98.3 °F (36.8 °C)     Physical Exam  Vitals and nursing note reviewed.   Constitutional:       General: She is awake.      Appearance: Normal appearance. She is well-developed.   Cardiovascular:      Rate and Rhythm: Normal rate and regular rhythm.      Heart sounds: Normal heart sounds, S1 normal and S2 normal. No murmur heard.  Pulmonary:      Effort: Pulmonary effort is normal. No accessory muscle usage or respiratory distress.      Breath sounds: Normal breath sounds and air entry.   Musculoskeletal:      Right lower leg: No edema.      Left lower leg: No edema.   Feet:      Right foot:      Skin integrity: Skin breakdown present.      Left foot:      Skin integrity: Skin breakdown present.      Comments: Skin breakdown more noticeable on right foot. Mild peeling and erythema noted between affected toes; slight odor noted.   Neurological:       Mental Status: She is alert and oriented to person, place, and time.   Psychiatric:         Attention and Perception: Attention and perception normal.         Mood and Affect: Mood and affect normal.         Speech: Speech normal.         Behavior: Behavior normal. Behavior is cooperative.         Thought Content: Thought content normal.         Cognition and Memory: Cognition and memory normal.         Judgment: Judgment normal.       Results     Lab Results   Component Value Date    WBC 9.96 07/01/2025    RBC 4.03 (L) 07/01/2025    HGB 13.2 07/01/2025    HCT 39.4 07/01/2025    MCV 97.8 (H) 07/01/2025    MCH 32.8 (H) 07/01/2025    MCHC 33.5 07/01/2025    RDW 13.3 07/01/2025     07/01/2025    MPV 10.0 07/01/2025     Sodium   Date Value Ref Range Status   07/01/2025 139 136 - 145 mmol/L Final   10/29/2024 135 (L) 136 - 145 mmol/L Final     Potassium   Date Value Ref Range Status   07/01/2025 3.6 3.5 - 5.1 mmol/L Final   10/29/2024 3.9 3.5 - 5.1 mmol/L Final     Chloride   Date Value Ref Range Status   07/01/2025 107 98 - 107 mmol/L Final   10/29/2024 105 100 - 109 mmol/L Final     CO2   Date Value Ref Range Status   07/01/2025 27 22 - 29 mmol/L Final     Carbon Dioxide   Date Value Ref Range Status   10/29/2024 24 22 - 33 mmol/L Final     Glucose   Date Value Ref Range Status   07/01/2025 87 74 - 100 mg/dL Final     Blood Urea Nitrogen   Date Value Ref Range Status   07/01/2025 9.8 7.0 - 18.7 mg/dL Final   10/29/2024 9 5 - 25 mg/dL Final     Creatinine   Date Value Ref Range Status   07/01/2025 0.77 0.55 - 1.02 mg/dL Final   10/29/2024 0.69 0.55 - 1.02 mg/dL Final     Calcium   Date Value Ref Range Status   07/01/2025 8.5 8.4 - 10.2 mg/dL Final   10/29/2024 8.7 (L) 8.8 - 10.6 mg/dL Final     Protein Total   Date Value Ref Range Status   07/01/2025 7.7 6.4 - 8.3 gm/dL Final     Albumin   Date Value Ref Range Status   07/01/2025 3.6 3.5 - 5.0 g/dL Final     Bilirubin Total   Date Value Ref Range Status    07/01/2025 0.8 <=1.5 mg/dL Final     ALP   Date Value Ref Range Status   07/01/2025 99 40 - 150 unit/L Final     AST   Date Value Ref Range Status   07/01/2025 11 11 - 45 unit/L Final     ALT   Date Value Ref Range Status   07/01/2025 12 0 - 55 unit/L Final     Anion Gap   Date Value Ref Range Status   10/29/2024 6 (L) 8 - 16 mmol/L Final     eGFR    Date Value Ref Range Status   10/29/2024 115 mL/min/1.73mSq Final     Comment:     In accordance with NKF-ASN Task Force recommendation, calculation based on the Chronic Kidney Disease Epidemiology Collaboration (CKD-EPI) equation without adjustment for race. eGFR adjusted for gender and age and calculated in ml/min/1.73mSquared. eGFR cannot be calculated if patient is under 18 years of age.     Reference Range:   >= 60 ml/min/1.73mSquared.     Lab Results   Component Value Date    CHOL 111 07/01/2025     Lab Results   Component Value Date    HDL 43 07/01/2025     Lab Results   Component Value Date    TRIG 76 07/01/2025     Lab Results   Component Value Date    VLDL 15 07/01/2025     Lab Results   Component Value Date    LDL 53.00 07/01/2025     Lab Results   Component Value Date    TSH 0.710 07/01/2025     Lab Results   Component Value Date    PHUR 6.5 09/05/2024    PROTEINUA Negative 09/05/2024    GLUCUA Normal 09/05/2024    OCCULTUA Negative 09/05/2024    NITRITE Negative 09/05/2024    LEUKOCYTESUR Negative 09/05/2024     Lab Results   Component Value Date    HGBA1C 5.1 07/01/2025    HGBA1C 4.8 01/05/2023       Assessment         ICD-10-CM ICD-9-CM   1. Athlete's foot on left  B35.3 110.4   2. Primary hypertension  I10 401.9   3. Shoulder blade pain  M89.8X1 733.90   4. Vitamin D3 deficiency  E55.9 268.9   5. BMI 37.0-37.9, adult  Z68.37 V85.37       Plan      1. Athlete's foot on left  Assessment & Plan:  - Diflucan 150 mg once wk X 12 doses.  - Clotrimazole 1% cream apply BID.  - Advised to change out socks at work if needed, due to excess  moisture.  - Clean and dry well in-between toes after bath or shower.    Orders:  -     fluconazole (DIFLUCAN) 150 MG Tab; Take 1 tablet (150 mg total) by mouth once a week. for 12 doses  Dispense: 12 tablet; Refill: 2  -     clotrimazole (LOTRIMIN) 1 % cream; Apply topically 2 (two) times daily.  Dispense: 60 g; Refill: 2    2. Primary hypertension  Overview:  Low Sodium Diet (Dash Diet - less than 2 grams of sodium per day).  Monitor Blood Pressure daily and log. Report any consistent numbers greater than 140/90.  Smoking Cessation encouraged to aid in BP reduction.  Maintain healthy weight with goal BMI <30. Exercise 30 minutes per day 5 days per week      Assessment & Plan:  controlled  Hypertension Medications              furosemide (LASIX) 40 MG tablet Take 40 mg by mouth once daily.    NIFEdipine (PROCARDIA-XL) 60 MG (OSM) 24 hr tablet Take 1 tablet (60 mg total) by mouth once daily.        B/P in clinic 135/84, HR 65.  Follow low sodiium diet (DASH Diet - Less than 2 grams of sodium per day).  Monitor blood pressure daily and log. Report consistent numbers greater than 140/90.  Maintain healthy weight with goal BMI <30. Exercise 30 minutes per day, 4-5 days/wk.      3. Shoulder blade pain  -     Ambulatory Referral/Consult to Physical Therapy    4. Vitamin D3 deficiency  Assessment & Plan:   Latest Reference Range & Units 07/01/25 10:59   Vitamin D 30 - 80 ng/mL 16 (L)   (L): Data is abnormally low    - Start Vit D3 50,000 IU once weekly.    Orders:  -     ergocalciferol (VITAMIN D2) 50,000 unit Cap; Take 1 capsule (50,000 Units total) by mouth every 7 days.  Dispense: 12 capsule; Refill: 5    5. BMI 37.0-37.9, adult         Future Appointments   Date Time Provider Department Center   7/30/2025  8:15 AM [facility], [facility] GYN [facility] GYN [facility]   8/6/2025  8:40 AM Karen Ruth FNP [facility]        Signature:       STEPAHNIE Orantes    [facility]  [address] [facility] [address]    Date of encounter: 7/16/25

## 2025-07-17 PROBLEM — E66.811 CLASS 1 OBESITY: Status: RESOLVED | Noted: 2023-01-24 | Resolved: 2025-07-17

## 2025-07-17 PROBLEM — E55.9 VITAMIN D3 DEFICIENCY: Status: ACTIVE | Noted: 2025-07-17

## 2025-07-17 NOTE — ASSESSMENT & PLAN NOTE
Latest Reference Range & Units 07/01/25 10:59   Vitamin D 30 - 80 ng/mL 16 (L)   (L): Data is abnormally low    - Start Vit D3 50,000 IU once weekly.

## 2025-07-17 NOTE — ASSESSMENT & PLAN NOTE
- Diflucan 150 mg once wk X 12 doses.  - Clotrimazole 1% cream apply BID.  - Advised to change out socks at work if needed, due to excess moisture.  - Clean and dry well in-between toes after bath or shower.

## 2025-07-20 NOTE — ASSESSMENT & PLAN NOTE
controlled  Hypertension Medications              furosemide (LASIX) 40 MG tablet Take 40 mg by mouth once daily.    NIFEdipine (PROCARDIA-XL) 60 MG (OSM) 24 hr tablet Take 1 tablet (60 mg total) by mouth once daily.        B/P in clinic 135/84, HR 65.  Follow low sodiium diet (DASH Diet - Less than 2 grams of sodium per day).  Monitor blood pressure daily and log. Report consistent numbers greater than 140/90.  Maintain healthy weight with goal BMI <30. Exercise 30 minutes per day, 4-5 days/wk.

## 2025-07-30 ENCOUNTER — OFFICE VISIT (OUTPATIENT)
Dept: GYNECOLOGY | Facility: CLINIC | Age: 40
End: 2025-07-30
Payer: MEDICAID

## 2025-07-30 VITALS
SYSTOLIC BLOOD PRESSURE: 118 MMHG | TEMPERATURE: 99 F | WEIGHT: 215.63 LBS | DIASTOLIC BLOOD PRESSURE: 83 MMHG | HEART RATE: 70 BPM | BODY MASS INDEX: 36.81 KG/M2 | OXYGEN SATURATION: 98 % | HEIGHT: 64 IN

## 2025-07-30 DIAGNOSIS — N93.9 ABNORMAL UTERINE BLEEDING (AUB): ICD-10-CM

## 2025-07-30 DIAGNOSIS — N94.89 ENDOSALPINGIOSIS: ICD-10-CM

## 2025-07-30 DIAGNOSIS — N92.0 INTERMENSTRUAL SPOTTING: ICD-10-CM

## 2025-07-30 DIAGNOSIS — R10.2 PELVIC PAIN: Primary | ICD-10-CM

## 2025-07-30 DIAGNOSIS — N80.9 ENDOMETRIOSIS: ICD-10-CM

## 2025-07-30 LAB
C TRACH DNA SPEC QL NAA+PROBE: NOT DETECTED
CLUE CELLS VAG QL WET PREP: NORMAL
N GONORRHOEA DNA SPEC QL NAA+PROBE: NOT DETECTED
SPECIMEN SOURCE: NORMAL
T VAGINALIS VAG QL WET PREP: NORMAL
WBC #/AREA VAG WET PREP: NORMAL
YEAST SPEC QL WET PREP: NORMAL

## 2025-07-30 PROCEDURE — 88174 CYTOPATH C/V AUTO IN FLUID: CPT

## 2025-07-30 PROCEDURE — 99214 OFFICE O/P EST MOD 30 MIN: CPT | Mod: PBBFAC

## 2025-07-30 PROCEDURE — 87591 N.GONORRHOEAE DNA AMP PROB: CPT

## 2025-07-30 PROCEDURE — 87210 SMEAR WET MOUNT SALINE/INK: CPT

## 2025-07-30 PROCEDURE — 87624 HPV HI-RISK TYP POOLED RSLT: CPT
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
## 2025-08-01 LAB
HIGH RISK HPV: NEGATIVE
PSYCHE PATHOLOGY RESULT: NORMAL

## 2025-08-06 ENCOUNTER — PATIENT MESSAGE (OUTPATIENT)
Dept: INTERNAL MEDICINE | Facility: CLINIC | Age: 40
End: 2025-08-06

## 2025-08-06 ENCOUNTER — HOSPITAL ENCOUNTER (OUTPATIENT)
Dept: RADIOLOGY | Facility: HOSPITAL | Age: 40
Discharge: HOME | End: 2025-08-06
Payer: MEDICAID

## 2025-08-06 ENCOUNTER — OFFICE VISIT (OUTPATIENT)
Dept: INTERNAL MEDICINE | Facility: CLINIC | Age: 40
End: 2025-08-06
Payer: MEDICAID

## 2025-08-06 VITALS
TEMPERATURE: 99 F | SYSTOLIC BLOOD PRESSURE: 138 MMHG | DIASTOLIC BLOOD PRESSURE: 89 MMHG | HEART RATE: 73 BPM | BODY MASS INDEX: 36.54 KG/M2 | WEIGHT: 214 LBS | RESPIRATION RATE: 16 BRPM | HEIGHT: 64 IN

## 2025-08-06 DIAGNOSIS — R10.2 PELVIC PAIN: ICD-10-CM

## 2025-08-06 DIAGNOSIS — Z32.00 POSSIBLE PREGNANCY, NOT CONFIRMED: ICD-10-CM

## 2025-08-06 DIAGNOSIS — J32.9 SINUSITIS, UNSPECIFIED CHRONICITY, UNSPECIFIED LOCATION: Primary | ICD-10-CM

## 2025-08-06 DIAGNOSIS — I10 PRIMARY HYPERTENSION: Chronic | ICD-10-CM

## 2025-08-06 DIAGNOSIS — N92.0 INTERMENSTRUAL SPOTTING: ICD-10-CM

## 2025-08-06 PROCEDURE — 3079F DIAST BP 80-89 MM HG: CPT | Mod: CPTII,,, | Performed by: FAMILY MEDICINE

## 2025-08-06 PROCEDURE — 99213 OFFICE O/P EST LOW 20 MIN: CPT | Mod: PBBFAC,25 | Performed by: FAMILY MEDICINE

## 2025-08-06 PROCEDURE — 3075F SYST BP GE 130 - 139MM HG: CPT | Mod: CPTII,,, | Performed by: FAMILY MEDICINE

## 2025-08-06 PROCEDURE — 76856 US EXAM PELVIC COMPLETE: CPT | Mod: TC

## 2025-08-06 PROCEDURE — 1159F MED LIST DOCD IN RCRD: CPT | Mod: CPTII,,, | Performed by: FAMILY MEDICINE

## 2025-08-06 PROCEDURE — 3044F HG A1C LEVEL LT 7.0%: CPT | Mod: CPTII,,, | Performed by: FAMILY MEDICINE

## 2025-08-06 PROCEDURE — 1160F RVW MEDS BY RX/DR IN RCRD: CPT | Mod: CPTII,,, | Performed by: FAMILY MEDICINE

## 2025-08-06 PROCEDURE — 3008F BODY MASS INDEX DOCD: CPT | Mod: CPTII,,, | Performed by: FAMILY MEDICINE

## 2025-08-06 PROCEDURE — 99214 OFFICE O/P EST MOD 30 MIN: CPT | Mod: S$PBB,,, | Performed by: FAMILY MEDICINE

## 2025-08-06 RX ORDER — PREDNISONE 20 MG/1
20 TABLET ORAL 2 TIMES DAILY
Qty: 10 TABLET | Refills: 0 | Status: SHIPPED | OUTPATIENT
Start: 2025-08-06 | End: 2025-08-11

## 2025-08-06 RX ORDER — CEPHALEXIN 500 MG/1
500 CAPSULE ORAL EVERY 12 HOURS
Qty: 20 CAPSULE | Refills: 0 | Status: SHIPPED | OUTPATIENT
Start: 2025-08-06 | End: 2025-08-16

## 2025-08-06 NOTE — PROGRESS NOTES
- LMP 2025  - hx infertility issues  - lower pelvic pain causing legs to hurt at times      LAWRENCE ROSE   [facility]  [address] [facility] [address]      PATIENT NAME: Renee Anaya  : 1985  DATE: 25  MRN: [id number]      Reason for Visit / Chief Complaint: Follow-up (Lab review , s/s sneezing and runny nose with some throat pain as well for 2 days )       History of Present Illness / Problem Focused Workflow     Renee Anaya presents to the clinic with Follow-up (Lab review , s/s sneezing and runny nose with some throat pain as well for 2 days )     Ms. Renee Anaya is a 41 y/o AA female who presents to clinic for F/U visit. HTN, HDL, bladder mass, and asthma hx noted. Followed by Urology and GYN. C/o sinus congestion, frontal sinus H/A, and productive cough with yellow/green mucus X 3-4 days. Unsure of fever, denies body aches and sore throat. Has tried OTC cold and sinus medications without relief in symptoms.     Reports her period is four days late; c/o lower pelvic pain, abdominal cramping, and nausea. Pelvic pain sometimes causes difficulty walking due to discomfort radiating down legs. Has pelvic U/S scheduled for later this afternoon. Advised to call GYN if period does not start by next week.     Order POC urine pregnancy test and HCG test today. Will call with results.    RTC 3 mos.                     Review of Systems     Review of Systems   HENT:  Positive for rhinorrhea, sinus pressure and sinus pain.    All other systems reviewed and are negative.      Medications and Allergies     Medications  Medication List with Changes/Refills   New Medications    CEPHALEXIN (KEFLEX) 500 MG CAPSULE    Take 1 capsule (500 mg total) by mouth every 12 (twelve) hours. for 10 days    PREDNISONE (DELTASONE) 20 MG TABLET    Take 1 tablet (20 mg total) by mouth 2 (two) times daily. for 5 days   Current Medications    ALBUTEROL  (PROVENTIL HFA) 90 MCG/ACTUATION INHALER    Inhale 2 puffs into the lungs every 6 (six) hours as needed for Wheezing. Rescue    ATORVASTATIN (LIPITOR) 40 MG TABLET    Take 1 tablet (40 mg total) by mouth every evening.    CETIRIZINE (ZYRTEC) 10 MG TABLET    Take 1 tablet (10 mg total) by mouth every evening.    CLOTRIMAZOLE (LOTRIMIN) 1 % CREAM    Apply topically 2 (two) times daily.    ERGOCALCIFEROL (VITAMIN D2) 50,000 UNIT CAP    Take 1 capsule (50,000 Units total) by mouth every 7 days.    FLUCONAZOLE (DIFLUCAN) 150 MG TAB    Take 1 tablet (150 mg total) by mouth once a week. for 12 doses    FUROSEMIDE (LASIX) 40 MG TABLET    Take 40 mg by mouth once daily.    GABAPENTIN (NEURONTIN) 300 MG CAPSULE    Take 2 capsules (600 mg total) by mouth 3 (three) times daily as needed (pain).    NIFEDIPINE (PROCARDIA-XL) 60 MG (OSM) 24 HR TABLET    Take 1 tablet (60 mg total) by mouth once daily.    OMEPRAZOLE (PRILOSEC) 20 MG CAPSULE    Take 1 capsule (20 mg total) by mouth once daily.       Allergies  Review of patient's allergies indicates:   Allergen Reactions    Penicillins Hives, Itching and Shortness Of Breath    Tramadol Itching    Ibuprofen Nausea Only    Milk containing products (dairy) Nausea Only       Physical Examination     Vitals:    08/06/25 0847   BP: 138/89   Pulse: 73   Resp: 16   Temp: 98.5 °F (36.9 °C)     Physical Exam  Vitals and nursing note reviewed.   Constitutional:       Appearance: She is ill-appearing.   HENT:      Head: Normocephalic and atraumatic.      Right Ear: Tympanic membrane is bulging.      Left Ear: Tympanic membrane is bulging.      Nose: Congestion and rhinorrhea present. Rhinorrhea is purulent.      Right Turbinates: Swollen.      Left Turbinates: Swollen.      Right Sinus: Frontal sinus tenderness present.      Left Sinus: Frontal sinus tenderness present.   Cardiovascular:      Rate and Rhythm: Normal rate and regular rhythm.      Pulses: Normal pulses.      Heart sounds: Normal  heart sounds, S1 normal and S2 normal. No murmur heard.  Pulmonary:      Effort: Pulmonary effort is normal. No accessory muscle usage or respiratory distress.      Breath sounds: Normal breath sounds.   Musculoskeletal:      Right lower leg: No edema.      Left lower leg: No edema.   Skin:     General: Skin is warm and dry.      Capillary Refill: Capillary refill takes less than 2 seconds.   Neurological:      General: No focal deficit present.      Mental Status: She is alert and oriented to person, place, and time.      Cranial Nerves: Cranial nerves 2-12 are intact.      Sensory: Sensation is intact.      Motor: Motor function is intact.      Coordination: Coordination is intact.      Gait: Gait is intact.   Psychiatric:         Attention and Perception: Attention and perception normal.         Mood and Affect: Mood and affect normal.         Speech: Speech normal.         Behavior: Behavior normal. Behavior is cooperative.         Thought Content: Thought content normal.         Cognition and Memory: Cognition and memory normal.         Judgment: Judgment normal.       Results     Lab Results   Component Value Date    WBC 9.96 07/01/2025    RBC 4.03 (L) 07/01/2025    HGB 13.2 07/01/2025    HCT 39.4 07/01/2025    MCV 97.8 (H) 07/01/2025    MCH 32.8 (H) 07/01/2025    MCHC 33.5 07/01/2025    RDW 13.3 07/01/2025     07/01/2025    MPV 10.0 07/01/2025     Sodium   Date Value Ref Range Status   07/01/2025 139 136 - 145 mmol/L Final   10/29/2024 135 (L) 136 - 145 mmol/L Final     Potassium   Date Value Ref Range Status   07/01/2025 3.6 3.5 - 5.1 mmol/L Final   10/29/2024 3.9 3.5 - 5.1 mmol/L Final     Chloride   Date Value Ref Range Status   07/01/2025 107 98 - 107 mmol/L Final   10/29/2024 105 100 - 109 mmol/L Final     CO2   Date Value Ref Range Status   07/01/2025 27 22 - 29 mmol/L Final     Carbon Dioxide   Date Value Ref Range Status   10/29/2024 24 22 - 33 mmol/L Final     Glucose   Date Value Ref Range  "Status   07/01/2025 87 74 - 100 mg/dL Final     Blood Urea Nitrogen   Date Value Ref Range Status   07/01/2025 9.8 7.0 - 18.7 mg/dL Final   10/29/2024 9 5 - 25 mg/dL Final     Creatinine   Date Value Ref Range Status   07/01/2025 0.77 0.55 - 1.02 mg/dL Final   10/29/2024 0.69 0.55 - 1.02 mg/dL Final     Calcium   Date Value Ref Range Status   07/01/2025 8.5 8.4 - 10.2 mg/dL Final   10/29/2024 8.7 (L) 8.8 - 10.6 mg/dL Final     Protein Total   Date Value Ref Range Status   07/01/2025 7.7 6.4 - 8.3 gm/dL Final     Albumin   Date Value Ref Range Status   07/01/2025 3.6 3.5 - 5.0 g/dL Final     Bilirubin Total   Date Value Ref Range Status   07/01/2025 0.8 <=1.5 mg/dL Final     ALP   Date Value Ref Range Status   07/01/2025 99 40 - 150 unit/L Final     AST   Date Value Ref Range Status   07/01/2025 11 11 - 45 unit/L Final     ALT   Date Value Ref Range Status   07/01/2025 12 0 - 55 unit/L Final     Anion Gap   Date Value Ref Range Status   10/29/2024 6 (L) 8 - 16 mmol/L Final     eGFR    Date Value Ref Range Status   10/29/2024 115 mL/min/1.73mSq Final     Comment:     In accordance with NKF-ASN Task Force recommendation, calculation based on the Chronic Kidney Disease Epidemiology Collaboration (CKD-EPI) equation without adjustment for race. eGFR adjusted for gender and age and calculated in ml/min/1.73mSquared. eGFR cannot be calculated if patient is under 18 years of age.     Reference Range:   >= 60 ml/min/1.73mSquared.     Lab Results   Component Value Date    CHOL 111 07/01/2025     Lab Results   Component Value Date    HDL 43 07/01/2025     Lab Results   Component Value Date    TRIG 76 07/01/2025     Lab Results   Component Value Date    VLDL 15 07/01/2025     No components found for: "LDL"  Lab Results   Component Value Date    TSH 0.710 07/01/2025     Lab Results   Component Value Date    PHUR 6.5 09/05/2024    PROTEINUA Negative 09/05/2024    GLUCUA Normal 09/05/2024    OCCULTUA Negative " 09/05/2024    NITRITE Negative 09/05/2024    LEUKOCYTESUR Negative 09/05/2024     Lab Results   Component Value Date    HGBA1C 5.1 07/01/2025    HGBA1C 4.8 01/05/2023           Assessment         ICD-10-CM ICD-9-CM   1. Sinusitis, unspecified chronicity, unspecified location  J32.9 473.9   2. Possible pregnancy, not confirmed  Z32.00 V72.40   3. Primary hypertension  I10 401.9   4. Pelvic pain  R10.2 [id number]       Plan      1. Sinusitis, unspecified chronicity, unspecified location  Assessment & Plan:  - Start Cephalexin 500 mg BID X 10 days.  - Start Prednisone 20 mg BID X 5 days.  - Advised to replace Zyrtec with Xyzal daily QHS.  - Continue Flonase 2 sprays each nare QD.  - Rest, and increase daily hydration.     Orders:  -     predniSONE (DELTASONE) 20 MG tablet; Take 1 tablet (20 mg total) by mouth 2 (two) times daily. for 5 days  Dispense: 10 tablet; Refill: 0  -     cephALEXin (KEFLEX) 500 MG capsule; Take 1 capsule (500 mg total) by mouth every 12 (twelve) hours. for 10 days  Dispense: 20 capsule; Refill: 0    2. Possible pregnancy, not confirmed  -     POCT Urine Pregnancy; Future; Expected date: 08/07/2025  -     HCG, Quantitative; Future; Expected date: 08/07/2025    3. Primary hypertension  Assessment & Plan:  - B/P in clinic today 138/89.   - B/P likely elevated today due to illness.   - Advised to continue monitoring B/Ps at home BID and PRN.          Future Appointments   Date Time Provider Department Center   10/1/2025  2:30 PM [facility], [facility] GYN [facility] GYN [facility]   11/6/2025  8:00 AM Karen Ruth FNP [facility] NAYELI [facility]          Signature:     STEPHANIE Orantes    [facility]  [address] [facility] [address]    Date of encounter: 8/6/25

## 2025-08-07 ENCOUNTER — TELEPHONE (OUTPATIENT)
Dept: INTERNAL MEDICINE | Facility: CLINIC | Age: 40
End: 2025-08-07
Payer: MEDICAID

## 2025-08-07 NOTE — TELEPHONE ENCOUNTER
Copied from CRM #[id number]. Topic: General Inquiry - Patient Advice  >> Aug 7, 2025  9:56 AM [place] wrote:  Who Called: Kandicegeorgina Anaya    Caller is requesting assistance/information from provider's office.    Symptoms (please be specific): na   How long has patient had these symptoms:  na  List of preferred pharmacies on file (remove unneeded): na    Preferred Method of Contact: Phone Call  Patient's Preferred Phone Number on File: [phone]   Best Call Back Number, if different: na  Additional Information:  pt had visit yesterday; having symptoms of R side hurting, sinus issues.   Wants to know whey chlamydia and gonorrhea tests were ran and not a pregnancy test.

## 2025-08-08 ENCOUNTER — LAB VISIT (OUTPATIENT)
Dept: LAB | Facility: HOSPITAL | Age: 40
End: 2025-08-08
Attending: FAMILY MEDICINE
Payer: MEDICAID

## 2025-08-08 DIAGNOSIS — Z32.00 POSSIBLE PREGNANCY, NOT CONFIRMED: ICD-10-CM

## 2025-08-08 LAB — B-HCG FREE SERPL-ACNC: <2.42 MIU/ML

## 2025-08-08 PROCEDURE — 36415 COLL VENOUS BLD VENIPUNCTURE: CPT

## 2025-08-08 PROCEDURE — 84702 CHORIONIC GONADOTROPIN TEST: CPT

## 2025-08-09 PROBLEM — J32.9 SINUSITIS: Status: ACTIVE | Noted: 2025-08-09

## 2025-08-10 NOTE — ASSESSMENT & PLAN NOTE
- Start Cephalexin 500 mg BID X 10 days.  - Start Prednisone 20 mg BID X 5 days.  - Advised to replace Zyrtec with Xyzal daily QHS.  - Continue Flonase 2 sprays each nare QD.  - Rest, and increase daily hydration.    RTC 4/4 or 4/5 to see me.

## 2025-08-10 NOTE — ASSESSMENT & PLAN NOTE
- B/P in clinic today 138/89.   - B/P likely elevated today due to illness.   - Continue Procardia 60 mg QD and Lasix 40 mg QD..  - Advised to continue monitoring B/Ps at home BID and PRN.

## 2025-08-11 ENCOUNTER — TELEPHONE (OUTPATIENT)
Dept: INTERNAL MEDICINE | Facility: CLINIC | Age: 40
End: 2025-08-11
Payer: MEDICAID